# Patient Record
Sex: FEMALE | Race: WHITE | NOT HISPANIC OR LATINO | Employment: OTHER | URBAN - METROPOLITAN AREA
[De-identification: names, ages, dates, MRNs, and addresses within clinical notes are randomized per-mention and may not be internally consistent; named-entity substitution may affect disease eponyms.]

---

## 2017-10-23 ENCOUNTER — OFFICE VISIT (OUTPATIENT)
Dept: FAMILY MEDICINE | Facility: CLINIC | Age: 82
End: 2017-10-23

## 2017-10-23 VITALS
HEIGHT: 61 IN | DIASTOLIC BLOOD PRESSURE: 52 MMHG | RESPIRATION RATE: 16 BRPM | SYSTOLIC BLOOD PRESSURE: 122 MMHG | HEART RATE: 100 BPM | WEIGHT: 192.6 LBS | BODY MASS INDEX: 36.36 KG/M2 | OXYGEN SATURATION: 90 %

## 2017-10-23 DIAGNOSIS — K22.2 STRICTURE AND STENOSIS OF ESOPHAGUS: ICD-10-CM

## 2017-10-23 DIAGNOSIS — M35.00 SJOGREN'S SYNDROME, WITH UNSPECIFIED ORGAN INVOLVEMENT (H): ICD-10-CM

## 2017-10-23 DIAGNOSIS — E03.8 OTHER SPECIFIED HYPOTHYROIDISM: ICD-10-CM

## 2017-10-23 DIAGNOSIS — J47.9 BRONCHIECTASIS WITHOUT COMPLICATION (H): ICD-10-CM

## 2017-10-23 DIAGNOSIS — N18.30 CHRONIC KIDNEY DISEASE, STAGE III (MODERATE) (H): ICD-10-CM

## 2017-10-23 DIAGNOSIS — Z23 NEED FOR PROPHYLACTIC VACCINATION AND INOCULATION AGAINST INFLUENZA: ICD-10-CM

## 2017-10-23 DIAGNOSIS — Z00.00 MEDICARE ANNUAL WELLNESS VISIT, SUBSEQUENT: Primary | ICD-10-CM

## 2017-10-23 DIAGNOSIS — H72.92 PERFORATION OF TYMPANIC MEMBRANE, LEFT: ICD-10-CM

## 2017-10-23 DIAGNOSIS — J32.8 OTHER CHRONIC SINUSITIS: ICD-10-CM

## 2017-10-23 DIAGNOSIS — J45.30 MILD PERSISTENT ASTHMA, UNCOMPLICATED: ICD-10-CM

## 2017-10-23 PROCEDURE — 36415 COLL VENOUS BLD VENIPUNCTURE: CPT | Performed by: FAMILY MEDICINE

## 2017-10-23 PROCEDURE — 90662 IIV NO PRSV INCREASED AG IM: CPT | Performed by: FAMILY MEDICINE

## 2017-10-23 PROCEDURE — 99215 OFFICE O/P EST HI 40 MIN: CPT | Mod: 25 | Performed by: FAMILY MEDICINE

## 2017-10-23 PROCEDURE — G0439 PPPS, SUBSEQ VISIT: HCPCS | Performed by: FAMILY MEDICINE

## 2017-10-23 PROCEDURE — G0008 ADMIN INFLUENZA VIRUS VAC: HCPCS | Performed by: FAMILY MEDICINE

## 2017-10-23 NOTE — LETTER
[facility] [address] [place]  [address]  Phone: [phone]    October 24, 2017      June Huddleston  [address] [place] [address]              Dear June,    The results from your recent visit are enclosed.     It was a pleasure to see you as always.   The lab tests show normal thyroid, and minor abnormalities of kidney function and blood sugar.   The kidney abnormality is very minor.     Check it again in one year, Also if you take can less celebrex that would be good.   Best wishes        Sincerely,     Harjinder Roe M.D.    Results for orders placed or performed in visit on 10/23/17   TSH ([facility])   Result Value Ref Range    TSH 4.170 0.450 - 4.500 uIU/mL    Narrative    Performed at:  01 - [facility] [address], [place], [place]  [id number]  : Agustín Almonte MD, Phone:  [phone]   Basic Metabolic Panel (8) ([facility])   Result Value Ref Range    Glucose 103 (H) 65 - 99 mg/dL    Urea Nitrogen 21 8 - 27 mg/dL    Creatinine 1.30 (H) 0.57 - 1.00 mg/dL    eGFR If NonAfricn Am 36 (L) >59 mL/min/1.73    eGFR If Africn Am 42 (L) >59 mL/min/1.73    BUN/Creatinine Ratio 16 12 - 28    Sodium 140 134 - 144 mmol/L    Potassium 4.1 3.5 - 5.2 mmol/L    Chloride 97 96 - 106 mmol/L    Total CO2 25 18 - 28 mmol/L    Calcium 10.4 (H) 8.7 - 10.3 mg/dL    Narrative    Performed at:  01 - [facility] [address], [place], [place]  [id number]  : Agustín Almonte MD, Phone:  [phone]

## 2017-10-23 NOTE — PROGRESS NOTES
Injectable Influenza Immunization Documentation    1.  Is the person to be vaccinated sick today?   No    2. Does the person to be vaccinated have an allergy to a component   of the vaccine?   No  Egg Allergy Algorithm Link    3. Has the person to be vaccinated ever had a serious reaction   to influenza vaccine in the past?   No    4. Has the person to be vaccinated ever had Guillain-Barré syndrome?   No    Form completed by Dorothea Hennessy MA October 23, 2017 9:05 AM           SUBJECTIVE:   June Huddleston is a 89 year old female who presents for Preventive Visit.  Flu shot, fasting labs  Delightful 89-year-old lady who continues to drive, cook her own meals, and enjoy life. She has a cane when she is out and a Walker when at [facility].    Lives in [address]  Totally independent 2017 -Meals / bills  Three kids Son in [facility]-   Ex otis in [facility] [phone]  Son in [place] - [place] - [phone]    Also Multiple medical issues   See notes  coughing - x 5yrs feels it is a nuisance not a major problem not getting worse. Cough persisits - has seen Dr Pretty, but not for  3years  ,had lung biopsy. Dr Betancourt. Last Chest CT in 2015   ENT - Dr Betancourt- sinus culture , perforated eardrum.  She follows up with him periodically  Bronchiectasis - known diagnosis but not having flares of infection/ inflammation. Not currently not using any inhalers as thy were not helpful   Arthritis - celebrex daily, helps   Hypothyroid on levothyroxine.  History of stricture of esophagus.  She has not had any problems with swallowing, and has no symptoms of reflux.    Are you in the first 12 months of your Medicare Part B coverage?  No    Healthy Habits:    Do you get at least three servings of calcium containing foods daily (dairy, green leafy vegetables, etc.)? yes and vit d & calcium & MV    Amount of exercise or daily activities, outside of work: 7 day(s) per week- walks halls, housework    Problems taking medications  regularly No    Medication side effects: No    Have you had an eye exam in the past two years? prn    Do you see a dentist twice per year? yes    Do you have sleep apnea, excessive snoring or daytime drowsiness?no    COGNITIVE SCREEN  1) Repeat 3 items (Banana, Sunrise, Chair)    2) Clock draw: NORMAL  3) 3 item recall: Recalls 3 objects  Results: 3 items recalled: COGNITIVE IMPAIRMENT LESS LIKELY    Mini-CogTM Copyright MYRIAM Ward. Licensed by the author for use in [facility]; reprinted with permission (ruy@[facility]). All rights reserved.        Reviewed and updated as needed this visit by clinical staffTobacco  Allergies  Meds         Reviewed and updated as needed this visit by Provider        Social History   Substance Use Topics     Smoking status: Former Smoker     Quit date: 10/4/1965     Smokeless tobacco: Never Used     Alcohol use 0.0 oz/week     0 Standard drinks or equivalent per week      Comment: special occasions       no alcohol  Past Medical History:   Diagnosis Date     Bronchiectasis (H) 2014 Dr. Cirilo Pretty    Mucus impaction on CT scan- treated with mucolytic, broncoscopy in 2015 neg cultures     Calculus of gallbladder without mention of cholecystitis or obstruction      Chronic rhinitis      Generalized osteoarthrosis, unspecified site      History of lung biopsy 11-18-15    Dr Kerr Barbra- nodule     Migraine, unspecified, without mention of intractable migraine without mention of status migrainosus      Mild persistent asthma      Pneumonia, organism unspecified(486)      Postinflammatory pulmonary fibrosis (H)      Sensorineural hearing loss, unspecified      Sicca syndrome (H)      Sjoegren syndrome (H)      Stricture and stenosis of esophagus      Unspecified hypothyroidism      Past Surgical History:   Procedure Laterality Date     BRONCHOSCOPY FLEXIBLE  6/11/2011    Procedure:BRONCHOSCOPY FLEXIBLE; Surgeon:DILLON BRADFORD; Location:[facility] OR     BRONCHOSCOPY RIGID   6/11/2011    Procedure:BRONCHOSCOPY RIGID; Surgeon:DILLON BRADFORD; Location:[facility] OR     C NONSPECIFIC PROCEDURE      urethral dilatation     CATARACT IOL, RT/LT      Cataract IOL RT/LT     ENDOBRONCHIAL ULTRASOUND FLEXIBLE  6/11/2011    Procedure:ENDOBRONCHIAL ULTRASOUND FLEXIBLE; Flexible Bronchoscopy. Endobronchial Ultra Sound; Surgeon:DILLON BRADFORD; Location:[facility] OR     Current Outpatient Prescriptions   Medication         celecoxib (CELEBREX) 200 MG capsule     levothyroxine (SYNTHROID, LEVOTHROID) 75 MCG tablet     vitamin  B complex with vitamin C (VITAMIN  B COMPLEX) TABS     calcium carb 1250 mg, 500 mg [place],/vitamin D 200 units (OSCAL WITH D) 500-200 MG-UNIT per tablet     Ascorbic Acid (VITAMIN C CR PO)     Multiple Vitamins-Minerals (CENTRUM SILVER PO)     No current facility-administered medications for this visit.        Today's PHQ-2 Score:   PHQ-2 ( 1999 Pfizer) 10/23/2017 10/27/2016   Q1: Little interest or pleasure in doing things 0 0   Q2: Feeling down, depressed or hopeless 0 0   PHQ-2 Score 0 0       Do you feel safe in your environment - Yes    Do you have a Health Care Directive?: Yes: Advance Directive has been received and scanned.    Current providers sharing in care for this patient include:   Patient Care Team:  Harjinder Burton MD as PCP - General (Family Practice)   DR COVINGTON- ENT  DR BARKER - PULM , LAST SEEN IN 2014    Hearing impairment: wears hearing aids    Ability to successfully perform activities of daily living: Yes, no assistance needed     Fall risk:  Fallen 2 or more times in the past year?: No  Any fall with injury in the past year?: No      Home safety:  none identified      The following health maintenance items are reviewed in Epic and correct as of today:  Health Maintenance   Topic Date Due     ASTHMA ACTION PLAN Q1 YR  05/19/1933     ASTHMA CONTROL TEST Q6 MOS  05/19/1933     ADVANCE DIRECTIVE PLANNING Q5 YRS  04/26/2017     TSH Q1 YEAR  10/27/2017      "FALL RISK ASSESSMENT  10/27/2017     MEDICARE ANNUAL WELLNESS VISIT  10/23/2018     TETANUS IMMUNIZATION (SYSTEM ASSIGNED)  10/31/2022     INFLUENZA VACCINE (SYSTEM ASSIGNED)  Completed     PNEUMOCOCCAL  Completed     Labs reviewed in EPIC        ROS:  C: NEGATIVE for fever, chills, change in weight  I: NEGATIVE for worrisome rashes, moles or lesions  E: NEGATIVE for vision changes or irritation  ENT/MOUTH: hearing  Aides, voice hoarse for years  RESP:as above, denies sob  B: NEGATIVE for masses, tenderness or discharge  CV: NEGATIVE for chest pain, palpitations or peripheral edema  GI: NEGATIVE for nausea, abdominal pain, heartburn, or change in bowel habits  : NEGATIVE for frequency, dysuria, or hematuria  MUSCULOSKELETAL:aches in hands and multiple joinnts  N: NEGATIVE for weakness, dizziness or paresthesias  E: NEGATIVE for temperature intolerance, skin/hair changes  H: NEGATIVE for bleeding problems  P: NEGATIVE for changes in mood or affect    OBJECTIVE:   /52  Pulse 100  Resp 16  Ht 1.556 m (5' 1.25\")  Wt 87.4 kg (192 lb 9.6 oz)  SpO2 90%  BMI 36.09 kg/m2   /52  Pulse 100  Resp 16  Ht 1.556 m (5' 1.25\")  Wt 87.4 kg (192 lb 9.6 oz)  SpO2 90%  BMI 36.09 kg/m2    EXAM:   GENERAL APPEARANCE: obese delightful smiling and jokes healthy, alert and no distress  EYES: Eyes grossly normal to inspection, PERRL and conjunctivae and sclerae normal  HENT: oropharynx clear, oral mucous membranes moist and has small perf in left tm   NECK: no adenopathy, no asymmetry, masses, or scars and thyroid normal to palpation  RESP: lungs clear to auscultation - no rales, rhonchi or wheezes  CV: regular rate and rhythm, sys murmur 2/6   no  click or rub, no peripheral edema and peripheral pulses strong  ABDOMEN: soft, nontender, no hepatosplenomegaly, no masses and bowel sounds normal  MS: no musculoskeletal defects are noted and gait is age appropriate without ataxia  SKIN: no suspicious lesions or " rashes  NEURO: Normal strength and tone, sensory exam grossly normal, mentation intact and speech normal  PSYCH: mentation appears normal and affect normal/bright    ASSESSMENT / PLAN:   June was seen today for physical and flu shot.    Diagnoses and all orders for this visit:    Medicare annual wellness visit, subsequent  -     VENOUS COLLECTION  Has good habits , is staying active socially and walking daily    Need for prophylactic vaccination and inoculation against influenza  -     FLU VACCINE, INCREASED ANTIGEN, PRESV FREE, AGE 65+ [17148]  -     ADMIN INFLUENZA (For MEDICARE Patients ONLY) []  Multiple medical issues    Mild persistent asthma, uncomplicated  -     umeclidinium-vilanterol (ANORO ELLIPTA) 62.5-25 MCG/INH oral inhaler; Inhale 1 puff into the lungs daily  If hjelps  Cough stay on    Bronchiectasis without complication (H)  Has persistent cough that got big eval many years ago. Cough and symptoms about the same. Try Anoro to see if helpful for cough.  She is not interested in re evaluation of chronic cough. We agree Unless symptoms worsen observe    Sjogren's syndrome, with unspecified organ involvement (H)    Chronic kidney disease, stage III (moderate)  -repeat BMP annually     Stricture and stenosis of esophagus  Feels no sticking or reflux    Other specified hypothyroidism  -     TSH ([facility])  -     Basic Metabolic Panel (8) ([facility])      Perforation of tympanic membrane, left  staus quo per ENT    Other chronic sinusitis  Hist of, okay currently          End of Life Planning:  Patient currently has an advanced directive: Yes.  Practitioner is supportive of decision.    COUNSELING:  Reviewed preventive health counseling, as reflected in patient instructions       Regular exercise       Healthy diet/nutrition       Dental care       Osteoporosis Prevention/Bone Health        Estimated body mass index is 36.09 kg/(m^2) as calculated from the following:    Height as of this encounter:  "1.556 m (5' 1.25\").    Weight as of this encounter: 87.4 kg (192 lb 9.6 oz).  Weight management plan: Discussed healthy diet and exercise guidelines and patient will follow up in 12 months in clinic to re-evaluate.   reports that she quit smoking about 52 years ago. She has never used smokeless tobacco.        Appropriate preventive services were discussed with this patient, including applicable screening as appropriate for cardiovascular disease, diabetes, osteopenia/osteoporosis, and glaucoma.  As appropriate for age/gender, discussed screening for colorectal cancer, prostate cancer, breast cancer, and cervical cancer. Checklist reviewing preventive services available has been given to the patient.    Reviewed patients plan of care and provided an AVS. The Basic Care Plan (routine screening as documented in Health Maintenance) for June meets the Care Plan requirement. This Care Plan has been established and reviewed with the Patient.    Counseling Resources:  ATP IV Guidelines  Pooled Cohorts Equation Calculator  Breast Cancer Risk Calculator  FRAX Risk Assessment  ICSI Preventive Guidelines  Dietary Guidelines for Americans, 2010  USDA's MyPlate  ASA Prophylaxis  Lung CA Screening    >40 min spent with patient, greater than 50% spent on discussion/education/planning - as outlined in assessment and plan      Harjinder Burton MD  [facility]  "

## 2017-10-23 NOTE — MR AVS SNAPSHOT
After Visit Summary   10/23/2017    June Huddleston    MRN: [id number]           Patient Information     Date Of Birth          5/19/1928        Visit Information        Provider Department      10/23/2017 9:00 AM Harjinder Burton MD [facility]        Today's Diagnoses     Need for prophylactic vaccination and inoculation against influenza    -  1    Medicare annual wellness visit, subsequent        Mild persistent asthma, uncomplicated        Bronchiectasis without complication (H)        Sjogren's syndrome, with unspecified organ involvement (H)        Chronic kidney disease, stage III (moderate)        Stricture and stenosis of esophagus        Diaphragmatic hernia without obstruction and without gangrene        Other specified hypothyroidism        Perforation of tympanic membrane, left        Other chronic sinusitis          Care Instructions      Preventive Health Recommendations    Female Ages 65 +    Yearly exam:     See your health care provider every year in order to  o Review health changes.   o Discuss preventive care.    o Review your medicines if your doctor has prescribed any.      You no longer need a yearly Pap test unless you've had an abnormal Pap test in the past 10 years. If you have vaginal symptoms, such as bleeding or discharge, be sure to talk with your provider about a Pap test.      Every 1 to 2 years, have a mammogram.  If you are over 69, talk with your health care provider about whether or not you want to continue having screening mammograms.      Every 10 years, have a colonoscopy. Or, have a yearly FIT test (stool test). These exams will check for colon cancer.       Have a cholesterol test every 5 years, or more often if your doctor advises it.       Have a diabetes test (fasting glucose) every three years. If you are at risk for diabetes, you should have this test more often.       At age 65, have a bone density scan (DEXA) to check for osteoporosis (brittle  bone disease).    Shots:    Get a flu shot each year.    Get a tetanus shot every 10 years.    Talk to your doctor about your pneumonia vaccines. There are now two you should receive - Pneumovax (PPSV 23) and Prevnar (PCV 13).    Talk to your doctor about the shingles vaccine.    Talk to your doctor about the hepatitis B vaccine.    Nutrition:     Eat at least 5 servings of fruits and vegetables each day.      Eat whole-grain bread, whole-wheat pasta and brown rice instead of white grains and rice.      Talk to your provider about Calcium and Vitamin D.     Lifestyle    Exercise at least 150 minutes a week (30 minutes a day, 5 days a week). This will help you control your weight and prevent disease.      Limit alcohol to one drink per day.      No smoking.       Wear sunscreen to prevent skin cancer.       See your dentist twice a year for an exam and cleaning.      See your eye doctor every 1 to 2 years to screen for conditions such as glaucoma, macular degeneration and cataracts.          Follow-ups after your visit        Who to contact     If you have questions or need follow up information about today's clinic visit or your schedule please contact [facility] directly at [phone].  Normal or non-critical lab and imaging results will be communicated to you by [employer], letter or phone within 4 business days after the clinic has received the results. If you do not hear from us within 7 days, please contact the clinic through [employer] or phone. If you have a critical or abnormal lab result, we will notify you by phone as soon as possible.  Submit refill requests through [employer] or call your pharmacy and they will forward the refill request to us. Please allow 3 business days for your refill to be completed.          Additional Information About Your Visit        [employer] Information     [employer] lets you send messages to your doctor, view your test results, renew your prescriptions, schedule  "appointments and more. To sign up, go to [email or web address] . Click on \"Log in\" on the left side of the screen, which will take you to the Welcome page. Then click on \"Sign up Now\" on the right side of the page.     You will be asked to enter the access code listed below, as well as some personal information. Please follow the directions to create your username and password.     Your access code is: [email or web address]  Expires: 2018  9:52 AM     Your access code will  in 90 days. If you need help or a new code, please call your [facility] clinic or [phone].        Care EveryWhere ID     This is your Care EveryWhere ID. This could be used by other organizations to access your [facility] medical records  [id number]        Your Vitals Were     Pulse Respirations Height Pulse Oximetry BMI (Body Mass Index)       100 16 1.556 m (5' 1.25\") 90% 36.09 kg/m2        Blood Pressure from Last 3 Encounters:   10/23/17 122/52   16 124/86   10/27/16 126/64    Weight from Last 3 Encounters:   10/23/17 87.4 kg (192 lb 9.6 oz)   16 85.3 kg (188 lb)   10/27/16 85.5 kg (188 lb 6.4 oz)              We Performed the Following     ADMIN INFLUENZA (For MEDICARE Patients ONLY) []     Basic Metabolic Panel (8) ([facility])     FLU VACCINE, INCREASED ANTIGEN, PRESV FREE, AGE 65+ [43757]     TSH ([facility])          Today's Medication Changes          These changes are accurate as of: 10/23/17  9:52 AM.  If you have any questions, ask your nurse or doctor.               Start taking these medicines.        Dose/Directions    umeclidinium-vilanterol 62.5-25 MCG/INH oral inhaler   Commonly known as:  ANORO ELLIPTA   Used for:  Mild persistent asthma, uncomplicated   Started by:  Harjinder Burton MD        Dose:  1 puff   Inhale 1 puff into the lungs daily   Quantity:  1 Inhaler   Refills:  11         Stop taking these medicines if you haven't already. Please contact your care team if you have questions.     albuterol 108 " (90 BASE) MCG/ACT Inhaler   Commonly known as:  PROAIR HFA/PROVENTIL HFA/VENTOLIN HFA   Stopped by:  Harjinder Burton MD           PROBIOTIC DAILY Caps   Stopped by:  Harjinder Burton MD                Where to get your medicines      These medications were sent to [id number] - [place], [place] - [address] AT [address]  [address] [place] [place] [address]    Hours:  24-hours Phone:  [phone]     umeclidinium-vilanterol 62.5-25 MCG/INH oral inhaler                Primary Care Provider Office Phone # Fax #    Harjinder Burton [phone] [address]  [facility] [address]        Equal Access to Services     [facility]: Hadii aad ku hadasho Soomaali, waaxda luqadaha, qaybta kaalmada adeegyada, waxay idiin hayaan adeisael mancilla . So [facility] [phone].    ATENCIÓN: Si habla español, tiene a wong disposición servicios gratuitos de asistencia lingüística. [facility] [phone].    We comply with applicable federal civil rights laws and [place] laws. We do not discriminate on the basis of race, color, national origin, age, disability, sex, sexual orientation, or gender identity.            Thank you!     Thank you for choosing [facility]  for your care. Our goal is always to provide you with excellent care. Hearing back from our patients is one way we can continue to improve our services. Please take a few minutes to complete the written survey that you may receive in the mail after your visit with us. Thank you!             Your Updated Medication List - Protect others around you: Learn how to safely use, store and throw away your medicines at www.disposemymeds.org.          This list is accurate as of: 10/23/17  9:52 AM.  Always use your most recent med list.                   Brand Name Dispense Instructions for use Diagnosis    calcium carb 1250 mg (500 mg [place])/vitamin D 200 units 500-200 MG-UNIT per tablet    OSCAL with D    100  tablet    Take 1 tablet by mouth daily        celecoxib 200 MG capsule    celeBREX    60 capsule    TAKE 1 CAPSULE BY MOUTH AS NEEDED FOR PAIN ON A DAILY BASIS    Medication refill       CENTRUM SILVER PO      Take 1 tablet by mouth daily.        levothyroxine 75 MCG tablet    SYNTHROID/LEVOTHROID    90 tablet    Take 1 tablet (75 mcg) by mouth daily    Encounter for medication refill       umeclidinium-vilanterol 62.5-25 MCG/INH oral inhaler    ANORO ELLIPTA    1 Inhaler    Inhale 1 puff into the lungs daily    Mild persistent asthma, uncomplicated       vitamin B complex with vitamin C Tabs tablet      Take 1 tablet by mouth daily        VITAMIN C CR PO      Take  by mouth daily.

## 2017-10-24 LAB
BUN SERPL-MCNC: 21 MG/DL (ref 8–27)
BUN/CREATININE RATIO: 16 (ref 12–28)
CALCIUM SERPL-MCNC: 10.4 MG/DL (ref 8.7–10.3)
CHLORIDE SERPLBLD-SCNC: 97 MMOL/L (ref 96–106)
CREAT SERPL-MCNC: 1.3 MG/DL (ref 0.57–1)
EGFR IF AFRICN AM: 42 ML/MIN/1.73
EGFR IF NONAFRICN AM: 36 ML/MIN/1.73
GLUCOSE SERPL-MCNC: 103 MG/DL (ref 65–99)
POTASSIUM SERPL-SCNC: 4.1 MMOL/L (ref 3.5–5.2)
SODIUM SERPL-SCNC: 140 MMOL/L (ref 134–144)
TOTAL CO2: 25 MMOL/L (ref 18–28)
TSH BLD-ACNC: 4.17 UIU/ML (ref 0.45–4.5)

## 2017-11-20 DIAGNOSIS — Z76.0 ENCOUNTER FOR MEDICATION REFILL: ICD-10-CM

## 2017-11-20 RX ORDER — LEVOTHYROXINE SODIUM 75 UG/1
TABLET ORAL
Qty: 90 TABLET | Refills: 3 | Status: SHIPPED | OUTPATIENT
Start: 2017-11-20 | End: 2018-10-25

## 2017-11-20 NOTE — TELEPHONE ENCOUNTER
LEVOTHROID) 75 MCG last OV - cpx & labs 10/23/17  Dorothea Hennessy MA November 20, 2017 10:07 AM    TSH - 4.170 on 10/23/17

## 2018-10-25 ENCOUNTER — OFFICE VISIT (OUTPATIENT)
Dept: FAMILY MEDICINE | Facility: CLINIC | Age: 83
End: 2018-10-25

## 2018-10-25 VITALS
HEART RATE: 87 BPM | BODY MASS INDEX: 36.21 KG/M2 | WEIGHT: 191.8 LBS | HEIGHT: 61 IN | DIASTOLIC BLOOD PRESSURE: 84 MMHG | RESPIRATION RATE: 16 BRPM | OXYGEN SATURATION: 94 % | SYSTOLIC BLOOD PRESSURE: 124 MMHG

## 2018-10-25 DIAGNOSIS — Z00.00 ROUTINE GENERAL MEDICAL EXAMINATION AT A HEALTH CARE FACILITY: Primary | ICD-10-CM

## 2018-10-25 DIAGNOSIS — E03.9 HYPOTHYROIDISM, UNSPECIFIED TYPE: ICD-10-CM

## 2018-10-25 DIAGNOSIS — Z13.1 SCREENING FOR DIABETES MELLITUS: ICD-10-CM

## 2018-10-25 DIAGNOSIS — J45.30 MILD PERSISTENT ASTHMA, UNCOMPLICATED: ICD-10-CM

## 2018-10-25 DIAGNOSIS — L82.1 SEBORRHEIC KERATOSES: ICD-10-CM

## 2018-10-25 DIAGNOSIS — N18.30 CHRONIC KIDNEY DISEASE, STAGE III (MODERATE) (H): ICD-10-CM

## 2018-10-25 DIAGNOSIS — R05.3 CHRONIC COUGH: ICD-10-CM

## 2018-10-25 DIAGNOSIS — J84.10 POSTINFLAMMATORY PULMONARY FIBROSIS (H): ICD-10-CM

## 2018-10-25 DIAGNOSIS — N63.0 LUMP OR MASS IN BREAST: ICD-10-CM

## 2018-10-25 DIAGNOSIS — M35.00 SJOGREN'S SYNDROME, WITH UNSPECIFIED ORGAN INVOLVEMENT (H): ICD-10-CM

## 2018-10-25 DIAGNOSIS — Z23 NEED FOR PROPHYLACTIC VACCINATION AND INOCULATION AGAINST INFLUENZA: ICD-10-CM

## 2018-10-25 LAB
% GRANULOCYTES: 68.4 % (ref 42.2–75.2)
HCT VFR BLD AUTO: 44.7 % (ref 35–46)
HEMOGLOBIN: 14.4 G/DL (ref 11.8–15.5)
LYMPHOCYTES NFR BLD AUTO: 24.6 % (ref 20.5–51.1)
MCH RBC QN AUTO: 29.4 PG (ref 27–31)
MCHC RBC AUTO-ENTMCNC: 32.2 G/DL (ref 33–37)
MCV RBC AUTO: 91.3 FL (ref 80–100)
MONOCYTES NFR BLD AUTO: 7 % (ref 1.7–9.3)
PLATELET # BLD AUTO: 271 K/UL (ref 140–450)
RBC # BLD AUTO: 4.89 X10/CMM (ref 3.7–5.2)
WBC # BLD AUTO: 8 X10/CMM (ref 3.8–11)

## 2018-10-25 PROCEDURE — 85025 COMPLETE CBC W/AUTO DIFF WBC: CPT | Performed by: FAMILY MEDICINE

## 2018-10-25 PROCEDURE — 90662 IIV NO PRSV INCREASED AG IM: CPT | Performed by: FAMILY MEDICINE

## 2018-10-25 PROCEDURE — G0439 PPPS, SUBSEQ VISIT: HCPCS | Performed by: FAMILY MEDICINE

## 2018-10-25 PROCEDURE — 36415 COLL VENOUS BLD VENIPUNCTURE: CPT | Performed by: FAMILY MEDICINE

## 2018-10-25 PROCEDURE — 99212 OFFICE O/P EST SF 10 MIN: CPT | Mod: 25 | Performed by: FAMILY MEDICINE

## 2018-10-25 PROCEDURE — G0008 ADMIN INFLUENZA VIRUS VAC: HCPCS | Performed by: FAMILY MEDICINE

## 2018-10-25 RX ORDER — ALBUTEROL SULFATE 90 UG/1
1-2 AEROSOL, METERED RESPIRATORY (INHALATION) EVERY 4 HOURS PRN
Qty: 1 INHALER | Refills: 11 | Status: SHIPPED | OUTPATIENT
Start: 2018-10-25 | End: 2020-02-11

## 2018-10-25 RX ORDER — CELECOXIB 200 MG/1
CAPSULE ORAL
Qty: 90 CAPSULE | Refills: 3 | Status: SHIPPED | OUTPATIENT
Start: 2018-10-25 | End: 2020-03-23

## 2018-10-25 RX ORDER — LEVOTHYROXINE SODIUM 75 UG/1
75 TABLET ORAL DAILY
Qty: 90 TABLET | Refills: 3 | Status: SHIPPED | OUTPATIENT
Start: 2018-10-25 | End: 2019-08-16

## 2018-10-25 NOTE — LETTER
My Asthma Action Plan  Name: June Huddleston   YOB: 1928  Date: 10/25/2018   My doctor: Rosa Oneal MD   My clinic: [facility]        My Control Medicine: Anoro ellipta  My Rescue Medicine: Anoro Ellipta   My Asthma Severity: intermittent  Avoid your asthma triggers: Patient is unaware of triggers               GREEN ZONE   Good Control    I feel good    No cough or wheeze    Can work, sleep and play without asthma symptoms       Take your asthma control medicine every day.     1. If exercise triggers your asthma, take your rescue medication    15 minutes before exercise or sports, and    During exercise if you have asthma symptoms  2. Spacer to use with inhaler: If you have a spacer, make sure to use it with your inhaler             YELLOW ZONE Getting Worse  I have ANY of these:    I do not feel good    Cough or wheeze    Chest feels tight    Wake up at night   1. Keep taking your Green Zone medications  2. Start taking your rescue medicine:    every 20 minutes for up to 1 hour. Then every 4 hours for 24-48 hours.  3. If you stay in the Yellow Zone for more than 12-24 hours, contact your doctor.  4. If you do not return to the Green Zone in 12-24 hours or you get worse, start taking your oral steroid medicine if prescribed by your provider.           RED ZONE Medical Alert - Get Help  I have ANY of these:    I feel awful    Medicine is not helping    Breathing getting harder    Trouble walking or talking    Nose opens wide to breathe       1. Take your rescue medicine NOW  2. If your provider has prescribed an oral steroid medicine, start taking it NOW  3. Call your doctor NOW  4. If you are still in the Red Zone after 20 minutes and you have not reached your doctor:    Take your rescue medicine again and    Call 911 or go to the emergency room right away    See your regular doctor within 2 weeks of an Emergency Room or Urgent Care visit for follow-up treatment.          Annual  Reminders:  Meet with Asthma Educator,  Flu Shot in the Fall, consider Pneumonia Vaccination for patients with asthma (aged 19 and older).    Pharmacy:    [facility] [place], [place] - [address] AT [address] [place] [address] & [facility] [id number]  [place] - [address] [place] [address] AT [address] [place] [address] & [address]                      Asthma Triggers  How To Control Things That Make Your Asthma Worse    Triggers are things that make your asthma worse.  Look at the list below to help you find your triggers and what you can do about them.  You can help prevent asthma flare-ups by staying away from your triggers.      Trigger                                                          What you can do   Cigarette Smoke  Tobacco smoke can make asthma worse. Do not allow smoking in your home, car or around you.  Be sure no one smokes at a child s day care or school.  If you smoke, ask your health care provider for ways to help you quit.  Ask family members to quit too.  Ask your health care provider for a referral to Quit Plan to help you quit smoking, or call 6-606-820-PLAN.     Colds, Flu, Bronchitis  These are common triggers of asthma. Wash your hands often.  Don t touch your eyes, nose or mouth.  Get a flu shot every year.     Dust Mites  These are tiny bugs that live in cloth or carpet. They are too small to see. Wash sheets and blankets in hot water every week.   Encase pillows and mattress in dust mite proof covers.  Avoid having carpet if you can. If you have carpet, vacuum weekly.   Use a dust mask and HEPA vacuum.   Pollen and Outdoor Mold  Some people are allergic to trees, grass, or weed pollen, or molds. Try to keep your windows closed.  Limit time out doors when pollen count is high.   Ask you health care provider about taking medicine during allergy season.     Animal Dander  Some people are allergic to skin flakes, urine or saliva from pets with fur or feathers. Keep pets with fur or  feathers out of your home.    If you can t keep the pet outdoors, then keep the pet out of your bedroom.  Keep the bedroom door closed.  Keep pets off cloth furniture and away from stuffed toys.     Mice, Rats, and Cockroaches  Some people are allergic to the waste from these pests.   Cover food and garbage.  Clean up spills and food crumbs.  Store grease in the refrigerator.   Keep food out of the bedroom.   Indoor Mold  This can be a trigger if your home has high moisture. Fix leaking faucets, pipes, or other sources of water.   Clean moldy surfaces.  Dehumidify basement if it is damp and smelly.   Smoke, Strong Odors, and Sprays  These can reduce air quality. Stay away from strong odors and sprays, such as perfume, powder, hair spray, paints, smoke incense, paint, cleaning products, candles and new carpet.   Exercise or Sports  Some people with asthma have this trigger. Be active!  Ask your doctor about taking medicine before sports or exercise to prevent symptoms.    Warm up for 5-10 minutes before and after sports or exercise.     Other Triggers of Asthma  Cold air:  Cover your nose and mouth with a scarf.  Sometimes laughing or crying can be a trigger.  Some medicines and food can trigger asthma.

## 2018-10-25 NOTE — PROGRESS NOTES
SUBJECTIVE:   June Huddleston is a 89 year old female who presents for Preventive Visit.  CONCERNS:   COPD - persistent cough. Uses albuterol inhaler prn. Is not on any long acting inhalers.   Sinus Problems - persistent. No fevers.   She tells me she has been misdiagnosed with cancer twice in her lifetime - breast and lung. States has not had cancer. Wants me to be aware of her hx. No actions items.  Overall, she prefers less intervention to more.   Has numerous skin lesions she would like to have assessed. They catch on her clothing or w/ routine hygiene and become irritated, painful and sometimes bleed.   Are you in the first 12 months of your Medicare Part B coverage?  No    Healthy Habits:    Do you get at least three servings of calcium containing foods daily (dairy, green leafy vegetables, etc.)? Yes, drinks milk    Amount of exercise or daily activities, outside of work: Does own cleaning/ housework to keep active.    Problems taking medications regularly No    Medication side effects: No    Have you had an eye exam in the past two years? no    Do you see a dentist twice per year? yes    Do you have sleep apnea, excessive snoring or daytime drowsiness?no    Ability to successfully perform activities of daily living: Yes, no assistance needed    Home safety:  none identified     Hearing impairment: Hearing Aids -     Fall risk:  Fallen 2 or more times in the past year?: Yes  Any fall with injury in the past year?: No  Fell last week in the garage.     COGNITIVE SCREEN  1) Repeat 3 items (Leader, Season, Table)    2) Clock draw: NORMAL  3) 3 item recall: Recalls 3 objects  Results: 3 items recalled: COGNITIVE IMPAIRMENT LESS LIKELY    Mini-CogTM Copyright S Sylvia. Licensed by the author for use in [facility]; reprinted with permission (ruy@.[facility]). All rights reserved.      Reviewed and updated as needed this visit by clinical staff  Tobacco  Allergies  Meds         Reviewed and updated as  needed this visit by Provider        Social History   Substance Use Topics     Smoking status: Former Smoker     Quit date: 10/4/1965     Smokeless tobacco: Never Used     Alcohol use 0.0 oz/week     0 Standard drinks or equivalent per week      Comment: special occasions     If you drink alcohol do you typically have >3 drinks per day or >7 drinks per week? Not Applicable                        Today's PHQ-2 Score:   PHQ-2 ( 1999 Pf[employer]) 10/25/2018 10/23/2017   Q1: Little interest or pleasure in doing things 0 0   Q2: Feeling down, depressed or hopeless 0 0   PHQ-2 Score 0 0     Do you feel safe in your environment - Yes    Do you have a Health Care Directive?: Yes: Advance Directive has been received and scanned.    Current providers sharing in care for this patient include:   Patient Care Team:  Rosa Oneal MD as PCP - General (Family Practice)    The following health maintenance items are reviewed in Epic and correct as of today:  Health Maintenance   Topic Date Due     ADVANCE DIRECTIVE PLANNING Q5 YRS  04/26/2017     ASTHMA CONTROL TEST Q6 MOS  04/25/2019     TSH Q1 YEAR  10/25/2019     MEDICARE ANNUAL WELLNESS VISIT  10/25/2019     ASTHMA ACTION PLAN Q1 YR  10/25/2019     FALL RISK ASSESSMENT  10/25/2019     PHQ-2 Q1 YR  10/25/2019     TETANUS IMMUNIZATION (SYSTEM ASSIGNED)  10/31/2022     PNEUMOCOCCAL  Completed     INFLUENZA VACCINE  Completed     BP Readings from Last 3 Encounters:   11/01/18 118/64   10/25/18 124/84   10/23/17 122/52    Wt Readings from Last 3 Encounters:   11/01/18 87 kg (191 lb 12.8 oz)   10/25/18 87 kg (191 lb 12.8 oz)   10/23/17 87.4 kg (192 lb 9.6 oz)         Patient Active Problem List   Diagnosis     Allergic rhinitis     Postinflammatory pulmonary fibrosis (H)     Sensorineural hearing loss     Generalized osteoarthrosis, unspecified site     Chronic rhinitis     Mild persistent asthma     Stricture and stenosis of esophagus     Diaphragmatic hernia     Calculus of  gallbladder     Chronic kidney disease, stage III (moderate) (H)     History of lung biopsy     Bronchiectasis without complication (H)     Sjogren's syndrome, with unspecified organ involvement (H)     Other chronic sinusitis     Past Surgical History:   Procedure Laterality Date     BRONCHOSCOPY FLEXIBLE  6/11/2011    Procedure:BRONCHOSCOPY FLEXIBLE; Surgeon:DILLON BRADFORD; Location:[facility]     BRONCHOSCOPY RIGID  6/11/2011    Procedure:BRONCHOSCOPY RIGID; Surgeon:DILLON BRADFORD; Location:[facility]     C NONSPECIFIC PROCEDURE      urethral dilatation     CATARACT IOL, RT/LT      Cataract IOL RT/LT     ENDOBRONCHIAL ULTRASOUND FLEXIBLE  6/11/2011    Procedure:ENDOBRONCHIAL ULTRASOUND FLEXIBLE; Flexible Bronchoscopy. Endobronchial Ultra Sound; Surgeon:DILLON BRADFORD; Location:[facility] OR       Social History   Substance Use Topics     Smoking status: Former Smoker     Quit date: 10/4/1965     Smokeless tobacco: Never Used     Alcohol use 0.0 oz/week     0 Standard drinks or equivalent per week      Comment: special occasions     Family History   Problem Relation Age of Onset     Unknown/Adopted Mother      Unknown/Adopted Father      Unknown/Adopted Maternal Grandmother      Unknown/Adopted Maternal Grandfather      Unknown/Adopted Paternal Grandmother      Unknown/Adopted Paternal Grandfather      Unknown/Adopted Brother      Unknown/Adopted Sister      Unknown/Adopted No family hx of      Unknown/Adopted Son      Unknown/Adopted Daughter      Unknown/Adopted Other          Current Outpatient Prescriptions   Medication Sig Dispense Refill     albuterol (PROAIR HFA/PROVENTIL HFA/VENTOLIN HFA) 108 (90 Base) MCG/ACT inhaler Inhale 1-2 puffs into the lungs every 4 hours as needed for shortness of breath / dyspnea 1 Inhaler 11     Ascorbic Acid (VITAMIN C CR PO) Take  by mouth daily.       calcium carb 1250 mg, 500 mg [place],/vitamin D 200 units (OSCAL WITH D) 500-200 MG-UNIT per tablet Take 1 tablet by  mouth daily 100 tablet 3     celecoxib (CELEBREX) 200 MG capsule TAKE 1 CAPSULE BY MOUTH AS NEEDED FOR PAIN ON A DAILY BASIS 90 capsule 3     levothyroxine (SYNTHROID/LEVOTHROID) 75 MCG tablet Take 1 tablet (75 mcg) by mouth daily 90 tablet 3     Multiple Vitamins-Minerals (CENTRUM SILVER PO) Take 1 tablet by mouth daily.       vitamin  B complex with vitamin C (VITAMIN  B COMPLEX) TABS Take 1 tablet by mouth daily       levothyroxine (SYNTHROID/LEVOTHROID) 88 MCG tablet Take 88 mcg Tues, Thurs, Sat, Sun. Take 75 mcg on Mon, Wed, Friday. 90 tablet 1     Allergies   Allergen Reactions     Astelin [Azelastine Hydrochloride]      too drying     Belladonna Alkaloids      cafergot (nausea) with phenobarbital; reviewed with [facility]     Oxybutynin Chloride      dry mouth; reviewed with [facility]     Penicillins Itching     Reviewed with [facility]     Shellfish-Derived Products GI Disturbance, Nausea and Vomiting and Nausea     Vomited for 24 hours after eating  Vomited for 24 hours after eating     Sudafed [Pseudoephedrine Hcl]      too drying     Triamcinolone Acetonide      azmacort (ha)     Lanolin Rash     Recent Labs   Lab Test  10/25/18   1141  10/23/17   1000  10/27/16   1035  10/26/15   0930   10/27/14   1052  10/30/13   0905   10/31/12   1013   07/14/11   0245  07/13/11   1535   LDL   --    --    --    --    --   124*  143*   --   130*   < >  72   --    HDL   --    --    --    --    --   52  57   --   56   < >  26*   --    TRIG   --    --    --    --    --   112  157*   --   146   < >  100   --    ALT  19   --   13  11   < >   --    --    < >   --    < >   --   8   CR  1.17*  1.30*  1.18*  1.18*   < >   --    --    < >   --    < >  0.81  0.98   GFRESTIMATED   --    --    --    --    --    --    --    --    --    --   68  54*   GFRESTBLACK   --    --    --    --    --    --    --    --    --    --   82  66   POTASSIUM  4.2  4.1  4.5  4.4   < >   --    --    < >   --    < >  3.7  3.7  "  TSH   --    --    --    --    --    --    --    --    --    --    --   3.30    < > = values in this interval not displayed.      Pap not indicated.   She is interested in mammo due to hx of misdx of breast cancer.   She has had both pneumonia vaccinations, current on Tdap, has had 1 zoster vaccination. Desires flu shot today.     ROS:  Constitutional, HEENT, cardiovascular, pulmonary, GI, , musculoskeletal, neuro, skin, endocrine and psych systems are negative, except as otherwise noted.    OBJECTIVE:   /84  Pulse 87  Resp 16  Ht 1.549 m (5' 1\")  Wt 87 kg (191 lb 12.8 oz)  SpO2 94%  BMI 36.24 kg/m2 Estimated body mass index is 36.24 kg/(m^2) as calculated from the following:    Height as of this encounter: 1.549 m (5' 1\").    Weight as of this encounter: 87 kg (191 lb 12.8 oz).  EXAM:   GENERAL: healthy, alert and no distress. Appears younger than her 89 years.   EYES: Eyes grossly normal to inspection, PERRL and conjunctivae and sclerae normal  HENT: ear canals and TM's normal, nose and mouth without ulcers or lesions  NECK: no adenopathy, no asymmetry, masses, or scars and thyroid normal to palpation  RESP: lungs clear to auscultation - no rales, rhonchi or wheezes but lung sounds are distant.  BREAST: Fatty texture. I palpate a firm, immobile 1 cm mass/lump at 7;00 just inferior areola. No nipple discharge and no palpable axillary masses or adenopathy.   CV: regular rate and rhythm, normal S1 S2, no S3 or S4 with II/VI murmur (pt states is not new), no click or rub, no peripheral edema and peripheral pulses strong  ABDOMEN: soft, nontender, no hepatosplenomegaly, no masses and bowel sounds normal  MS: no gross musculoskeletal defects noted, no edema  SKIN: no suspicious lesions or rashes but multiple SKs present. She is wearing very heavy facial makeup; difficult to get a good inspection of all of her facial skin.   NEURO: Normal strength and tone, mentation intact and speech normal  PSYCH: " mentation appears normal, affect normal/bright    ASSESSMENT / PLAN:   June was seen today for physical, hearing screening and flu shot.    Diagnoses and all orders for this visit:    Routine general medical examination at a health care facility    Postinflammatory pulmonary fibrosis (H)    Sjogren's syndrome, with unspecified organ involvement (H)  -     Comp. Metabolic Panel (14) ([facility])  -     CBC with Diff/Plt ([facility])    Chronic kidney disease, stage III (moderate) (H)  -     Comp. Metabolic Panel (14) ([facility])    Mild persistent asthma, uncomplicated   Refill albuterol inhaler     Chronic cough  -     Comp. Metabolic Panel (14) ([facility])  -     CBC with Diff/Plt ([facility])  -     albuterol (PROAIR HFA/PROVENTIL HFA/VENTOLIN HFA) 108 (90 Base) MCG/ACT inhaler; Inhale 1-2 puffs into the lungs every 4 hours as needed for shortness of breath / dyspnea    Lump or mass in breast  -     MAMMO - Diagnostic Digital Bilateral (FUTURE/[facility]); Future   I have her palpate the lump in her breast. She confirms she can feel it and locate it w/o assistance. She is not sure if this is the location of her prior lump.   She is interested in pursuing further evaluation.     Hypothyroidism, unspecified type  -     TSH ([facility])  -     CBC with Diff/Plt ([facility])        -     levothyroxine (SYNTHROID/LEVOTHROID) 75 MCG tablet; Take 1 tablet (75 mcg) by mouth daily   Verify dosage is correct    Seborrheic keratoses   OK to RTC at any time for removal.     Screening for diabetes mellitus   Included in CMP.     Need for prophylactic vaccination and inoculation against influenza  -     FLU VACCINE, INCREASED ANTIGEN, PRESV FREE, AGE 65+ [01038]  -     ADMIN INFLUENZA (For MEDICARE Patients ONLY) []   Provided counseling to patient in influenza immunization. No known complications w/ vaccination today.     Other orders  -     Cancel: Lipid Panel ([facility]) - lab was pended, but we elect not to collect cholesterol today.   -      "celecoxib (CELEBREX) 200 MG capsule; TAKE 1 CAPSULE BY MOUTH AS NEEDED FOR PAIN ON A DAILY BASIS - has been taking prn for a few years for musculoskeletal pain.   -     Cancel: umeclidinium-vilanterol (ANORO ELLIPTA) 62.5-25 MCG/INH oral inhaler; Inhale 1 puff into the lungs daily - pt declines. States is not necessary.      End of Life Planning:  Patient currently has an advanced directive: Yes.  Practitioner is supportive of decision.    COUNSELING:  Reviewed preventive health counseling, as reflected in patient instructions       Regular exercise       Healthy diet/nutrition       Vision screening       Hearing screening       Dental care       Osteoporosis Prevention/Bone Health    BP Readings from Last 1 Encounters:   11/01/18 118/64     Estimated body mass index is 36.24 kg/(m^2) as calculated from the following:    Height as of this encounter: 1.549 m (5' 1\").    Weight as of this encounter: 87 kg (191 lb 12.8 oz).    Weight management plan: Discussed healthy diet and exercise guidelines and patient will follow up in 12 months in clinic to re-evaluate.     reports that she quit smoking about 53 years ago. She has never used smokeless tobacco.    Appropriate preventive services were discussed with this patient, including applicable screening as appropriate for cardiovascular disease, diabetes, osteopenia/osteoporosis, and glaucoma.  As appropriate for age/gender, discussed screening for colorectal cancer, prostate cancer, breast cancer, and cervical cancer. Checklist reviewing preventive services available has been given to the patient.    Reviewed patients plan of care and provided an AVS. The Basic Care Plan (routine screening as documented in Health Maintenance) for June meets the Care Plan requirement. This Care Plan has been established and reviewed with the Patient.    Counseling Resources:  ATP IV Guidelines  Pooled Cohorts Equation Calculator  Breast Cancer Risk Calculator  FRAX Risk " Assessment  ICSI Preventive Guidelines  Dietary Guidelines for Americans, 2010  [employer]'s MyPlate  ASA Prophylaxis  Lung CA Screening    Rsoa Oenal MD  [facility]    Health Maintenance   Topic Date Due     ADVANCE DIRECTIVE PLANNING Q5 YRS  04/26/2017     ASTHMA CONTROL TEST Q6 MOS  04/25/2019     TSH Q1 YEAR  10/25/2019     MEDICARE ANNUAL WELLNESS VISIT  10/25/2019     ASTHMA ACTION PLAN Q1 YR  10/25/2019     FALL RISK ASSESSMENT  10/25/2019     PHQ-2 Q1 YR  10/25/2019     TETANUS IMMUNIZATION (SYSTEM ASSIGNED)  10/31/2022     PNEUMOCOCCAL  Completed     INFLUENZA VACCINE  Completed         Injectable Influenza Immunization Documentation    1.  Is the person to be vaccinated sick today?   No    2. Does the person to be vaccinated have an allergy to a component   of the vaccine?   No  Egg Allergy Algorithm Link    3. Has the person to be vaccinated ever had a serious reaction   to influenza vaccine in the past?   No    4. Has the person to be vaccinated ever had Guillain-Barré syndrome?   No    Form completed by Kami Choi [facility]

## 2018-10-25 NOTE — MR AVS SNAPSHOT
After Visit Summary   10/25/2018    June Huddleston    MRN: [id number]           Patient Information     Date Of Birth          5/19/1928        Visit Information        Provider Department      10/25/2018 10:00 AM Rosa Oneal MD [facility]        Today's Diagnoses     Routine general medical examination at a health care facility    -  1    Postinflammatory pulmonary fibrosis (H)        Sjogren's syndrome, with unspecified organ involvement (H)        Chronic kidney disease, stage III (moderate) (H)        Mild persistent asthma, uncomplicated        Chronic cough        Hypothyroidism, unspecified type        Screening for diabetes mellitus        Need for prophylactic vaccination and inoculation against influenza        Lump or mass in breast        Seborrheic keratoses          Care Instructions      Preventive Health Recommendations    Female Ages 65 +    Yearly exam:     See your health care provider every year in order to  o Review health changes.   o Discuss preventive care.    o Review your medicines if your doctor has prescribed any.      You no longer need a yearly Pap test unless you've had an abnormal Pap test in the past 10 years. If you have vaginal symptoms, such as bleeding or discharge, be sure to talk with your provider about a Pap test.      Every 1 to 2 years, have a mammogram.  If you are over 69, talk with your health care provider about whether or not you want to continue having screening mammograms.      Every 10 years, have a colonoscopy. Or, have a yearly FIT test (stool test). These exams will check for colon cancer.       Have a cholesterol test every 5 years, or more often if your doctor advises it.       Have a diabetes test (fasting glucose) every three years. If you are at risk for diabetes, you should have this test more often.       At age 65, have a bone density scan (DEXA) to check for osteoporosis (brittle bone disease).    Shots:    Get a  flu shot each year.    Get a tetanus shot every 10 years.    Talk to your doctor about your pneumonia vaccines. There are now two you should receive - Pneumovax (PPSV 23) and Prevnar (PCV 13).    Talk to your pharmacist about the shingles vaccine.    Talk to your doctor about the hepatitis B vaccine.    Nutrition:     Eat at least 5 servings of fruits and vegetables each day.      Eat whole-grain bread, whole-wheat pasta and brown rice instead of white grains and rice.      Get adequate Calcium and Vitamin D.     Lifestyle    Exercise at least 150 minutes a week (30 minutes a day, 5 days a week). This will help you control your weight and prevent disease.      Limit alcohol to one drink per day.      No smoking.       Wear sunscreen to prevent skin cancer.       See your dentist twice a year for an exam and cleaning.      See your eye doctor every 1 to 2 years to screen for conditions such as glaucoma, macular degeneration and cataracts.          Follow-ups after your visit        Who to contact     If you have questions or need follow up information about today's clinic visit or your schedule please contact [facility] directly at [phone].  Normal or non-critical lab and imaging results will be communicated to you by Rankuhart, letter or phone within 4 business days after the clinic has received the results. If you do not hear from us within 7 days, please contact the clinic through InfiKnot or phone. If you have a critical or abnormal lab result, we will notify you by phone as soon as possible.  Submit refill requests through MedArk[employer] or call your pharmacy and they will forward the refill request to us. Please allow 3 business days for your refill to be completed.          Additional Information About Your Visit        MedArkive Information     MedArkive lets you send messages to your doctor, view your test results, renew your prescriptions, schedule appointments and more. To sign up, go to  "[email or web address]/MyChart . Click on \"Log in\" on the left side of the screen, which will take you to the Welcome page. Then click on \"Sign up Now\" on the right side of the page.     You will be asked to enter the access code listed below, as well as some personal information. Please follow the directions to create your username and password.     Your access code is: [email or web address]  Expires: 2019 11:31 AM     Your access code will  in 90 days. If you need help or a new code, please call your [facility] clinic or [phone].        Care EveryWhere ID     This is your Care EveryWhere ID. This could be used by other organizations to access your [facility] medical records  [id number]        Your Vitals Were     Pulse Respirations Height Pulse Oximetry BMI (Body Mass Index)       87 16 1.549 m (5' 1\") 94% 36.24 kg/m2        Blood Pressure from Last 3 Encounters:   10/25/18 124/84   10/23/17 122/52   16 124/86    Weight from Last 3 Encounters:   10/25/18 87 kg (191 lb 12.8 oz)   10/23/17 87.4 kg (192 lb 9.6 oz)   16 85.3 kg (188 lb)              We Performed the Following     ADMIN INFLUENZA (For MEDICARE Patients ONLY) []     CBC with Diff/Plt ([facility])     Comp. Metabolic Panel (14) ([facility])     FLU VACCINE, INCREASED ANTIGEN, PRESV FREE, AGE 65+ [02827]     TSH ([facility])          Today's Medication Changes          These changes are accurate as of 10/25/18 11:31 AM.  If you have any questions, ask your nurse or doctor.               Start taking these medicines.        Dose/Directions    albuterol 108 (90 Base) MCG/ACT inhaler   Commonly known as:  PROAIR HFA/PROVENTIL HFA/VENTOLIN HFA   Used for:  Chronic cough   Started by:  Rosa Oneal MD        Dose:  1-2 puff   Inhale 1-2 puffs into the lungs every 4 hours as needed for shortness of breath / dyspnea   Quantity:  1 Inhaler   Refills:  11         These medicines have changed or have updated prescriptions.        Dose/Directions    " levothyroxine 75 MCG tablet   Commonly known as:  SYNTHROID/LEVOTHROID   This may have changed:  See the new instructions.   Changed by:  Rosa Oneal MD        Dose:  75 mcg   Take 1 tablet (75 mcg) by mouth daily   Quantity:  90 tablet   Refills:  3            Where to get your medicines      These medications were sent to [facility] [address] [place] [address] [address] AT [address] [place] [address] & [facility]  [address] [place] [address]    Hours:  24-hours Phone:  [phone]     albuterol 108 (90 Base) MCG/ACT inhaler    celecoxib 200 MG capsule    levothyroxine 75 MCG tablet                Primary Care Provider Office Phone # Fax #    Rosa Oneal [phone] [address] [facility] [address]        Equal Access to Services     [facility]: Hadii aad ku hadasho Soomaali, waaxda luqadaha, qaybta kaalmada adeegyada, waxay idiin hayaan adeeg kharagrace laYaaaan . So [facility] [phone].    ATENCIÓN: Si habla español, tiene a wong disposición servicios gratuitos de asistencia lingüística. Llame al [phone].    We comply with applicable federal civil rights laws and [place] laws. We do not discriminate on the basis of race, color, national origin, age, disability, sex, sexual orientation, or gender identity.            Thank you!     Thank you for choosing [facility]  for your care. Our goal is always to provide you with excellent care. Hearing back from our patients is one way we can continue to improve our services. Please take a few minutes to complete the written survey that you may receive in the mail after your visit with us. Thank you!             Your Updated Medication List - Protect others around you: Learn how to safely use, store and throw away your medicines at www.disposemymeds.org.          This list is accurate as of 10/25/18 11:31 AM.  Always use your most recent med list.                   Brand Name Dispense Instructions for  use Diagnosis    albuterol 108 (90 Base) MCG/ACT inhaler    PROAIR HFA/PROVENTIL HFA/VENTOLIN HFA    1 Inhaler    Inhale 1-2 puffs into the lungs every 4 hours as needed for shortness of breath / dyspnea    Chronic cough       calcium carbonate 500 mg-vitamin D 200 units 500-200 MG-UNIT per tablet    OSCAL with D;OYSTER SHELL CALCIUM    100 tablet    Take 1 tablet by mouth daily        celecoxib 200 MG capsule    celeBREX    90 capsule    TAKE 1 CAPSULE BY MOUTH AS NEEDED FOR PAIN ON A DAILY BASIS        CENTRUM SILVER PO      Take 1 tablet by mouth daily.        levothyroxine 75 MCG tablet    SYNTHROID/LEVOTHROID    90 tablet    Take 1 tablet (75 mcg) by mouth daily        vitamin B complex with vitamin C Tabs tablet      Take 1 tablet by mouth daily        VITAMIN C CR PO      Take  by mouth daily.

## 2018-10-26 LAB
ALBUMIN SERPL-MCNC: 4.3 G/DL (ref 3.2–4.6)
ALBUMIN/GLOB SERPL: 1.4 {RATIO} (ref 1.2–2.2)
ALP SERPL-CCNC: 54 IU/L (ref 39–117)
ALT SERPL-CCNC: 19 IU/L (ref 0–32)
AST SERPL-CCNC: 31 IU/L (ref 0–40)
BILIRUB SERPL-MCNC: 0.4 MG/DL (ref 0–1.2)
BUN SERPL-MCNC: 23 MG/DL (ref 10–36)
BUN/CREATININE RATIO: 20 (ref 12–28)
CALCIUM SERPL-MCNC: 9.6 MG/DL (ref 8.7–10.3)
CHLORIDE SERPLBLD-SCNC: 100 MMOL/L (ref 96–106)
CREAT SERPL-MCNC: 1.17 MG/DL (ref 0.57–1)
EGFR IF AFRICN AM: 47 ML/MIN/1.73
EGFR IF NONAFRICN AM: 41 ML/MIN/1.73
GLOBULIN, TOTAL: 3.1 G/DL (ref 1.5–4.5)
GLUCOSE SERPL-MCNC: 96 MG/DL (ref 65–99)
POTASSIUM SERPL-SCNC: 4.2 MMOL/L (ref 3.5–5.2)
PROT SERPL-MCNC: 7.4 G/DL (ref 6–8.5)
SODIUM SERPL-SCNC: 141 MMOL/L (ref 134–144)
TOTAL CO2: 24 MMOL/L (ref 20–29)
TSH BLD-ACNC: 4.57 UIU/ML (ref 0.45–4.5)

## 2018-10-26 ASSESSMENT — ASTHMA QUESTIONNAIRES: ACT_TOTALSCORE: 19

## 2018-10-29 ENCOUNTER — HOSPITAL ENCOUNTER (OUTPATIENT)
Dept: MAMMOGRAPHY | Facility: CLINIC | Age: 83
Discharge: HOME | End: 2018-10-29
Attending: FAMILY MEDICINE | Admitting: FAMILY MEDICINE
Payer: MEDICARE

## 2018-10-29 ENCOUNTER — HOSPITAL ENCOUNTER (OUTPATIENT)
Dept: MAMMOGRAPHY | Facility: CLINIC | Age: 83
End: 2018-10-29
Attending: FAMILY MEDICINE
Payer: MEDICARE

## 2018-10-29 DIAGNOSIS — N63.0 LUMP OR MASS IN BREAST: ICD-10-CM

## 2018-10-29 DIAGNOSIS — N64.89 OTHER SPECIFIED DISORDERS OF BREAST: ICD-10-CM

## 2018-10-29 DIAGNOSIS — N63.13 LUMP IN LOWER OUTER QUADRANT OF RIGHT BREAST: ICD-10-CM

## 2018-10-29 PROCEDURE — 76642 ULTRASOUND BREAST LIMITED: CPT | Mod: RT

## 2018-10-29 PROCEDURE — 77066 DX MAMMO INCL CAD BI: CPT

## 2018-11-01 ENCOUNTER — OFFICE VISIT (OUTPATIENT)
Dept: FAMILY MEDICINE | Facility: CLINIC | Age: 83
End: 2018-11-01

## 2018-11-01 VITALS
BODY MASS INDEX: 36.24 KG/M2 | SYSTOLIC BLOOD PRESSURE: 118 MMHG | HEART RATE: 84 BPM | WEIGHT: 191.8 LBS | DIASTOLIC BLOOD PRESSURE: 64 MMHG | RESPIRATION RATE: 16 BRPM

## 2018-11-01 DIAGNOSIS — R79.89 ELEVATED TSH: ICD-10-CM

## 2018-11-01 DIAGNOSIS — L98.9 SKIN LESION: ICD-10-CM

## 2018-11-01 DIAGNOSIS — E03.9 HYPOTHYROIDISM, UNSPECIFIED TYPE: Primary | ICD-10-CM

## 2018-11-01 DIAGNOSIS — L82.1 SEBORRHEIC KERATOSES: ICD-10-CM

## 2018-11-01 PROCEDURE — 99212 OFFICE O/P EST SF 10 MIN: CPT | Mod: 25 | Performed by: FAMILY MEDICINE

## 2018-11-01 PROCEDURE — 17110 DESTRUCTION B9 LES UP TO 14: CPT | Performed by: FAMILY MEDICINE

## 2018-11-01 RX ORDER — LEVOTHYROXINE SODIUM 88 UG/1
TABLET ORAL
Qty: 90 TABLET | Refills: 1 | Status: SHIPPED | OUTPATIENT
Start: 2018-11-01 | End: 2019-10-22 | Stop reason: SINTOL

## 2018-11-01 NOTE — MR AVS SNAPSHOT
"              After Visit Summary   2018    June Huddleston    MRN: [id number]           Patient Information     Date Of Birth          1928        Visit Information        Provider Department      2018 11:30 AM Roas Oneal MD [facility]        Today's Diagnoses     Hypothyroidism, unspecified type    -  1    Elevated TSH        Skin lesion        Hemangioma        Seborrheic keratoses           Follow-ups after your visit        Who to contact     If you have questions or need follow up information about today's clinic visit or your schedule please contact [facility] directly at [phone].  Normal or non-critical lab and imaging results will be communicated to you by MyChart, letter or phone within 4 business days after the clinic has received the results. If you do not hear from us within 7 days, please contact the clinic through Jumper Networkshart or phone. If you have a critical or abnormal lab result, we will notify you by phone as soon as possible.  Submit refill requests through [employer] or call your pharmacy and they will forward the refill request to us. Please allow 3 business days for your refill to be completed.          Additional Information About Your Visit        MyChart Information     [employer] lets you send messages to your doctor, view your test results, renew your prescriptions, schedule appointments and more. To sign up, go to [email or web address] . Click on \"Log in\" on the left side of the screen, which will take you to the Welcome page. Then click on \"Sign up Now\" on the right side of the page.     You will be asked to enter the access code listed below, as well as some personal information. Please follow the directions to create your username and password.     Your access code is: [email or web address]  Expires: 2019 11:31 AM     Your access code will  in 90 days. If you need help or a new code, please call your [facility] clinic or [phone].   "      Care EveryWhere ID     This is your Care EveryWhere ID. This could be used by other organizations to access your [facility] medical records  [id number]        Your Vitals Were     Pulse Respirations BMI (Body Mass Index)             84 16 36.24 kg/m2          Blood Pressure from Last 3 Encounters:   11/01/18 118/64   10/25/18 124/84   10/23/17 122/52    Weight from Last 3 Encounters:   11/01/18 87 kg (191 lb 12.8 oz)   10/25/18 87 kg (191 lb 12.8 oz)   10/23/17 87.4 kg (192 lb 9.6 oz)              We Performed the Following     C DESTROY < 15 BENIGN SKIN LESIONS          Today's Medication Changes          These changes are accurate as of 11/1/18 12:52 PM.  If you have any questions, ask your nurse or doctor.               These medicines have changed or have updated prescriptions.        Dose/Directions    * levothyroxine 75 MCG tablet   Commonly known as:  SYNTHROID/LEVOTHROID   This may have changed:  Another medication with the same name was added. Make sure you understand how and when to take each.   Changed by:  Rosa Oneal MD        Dose:  75 mcg   Take 1 tablet (75 mcg) by mouth daily   Quantity:  90 tablet   Refills:  3       * levothyroxine 88 MCG tablet   Commonly known as:  SYNTHROID/LEVOTHROID   This may have changed:  You were already taking a medication with the same name, and this prescription was added. Make sure you understand how and when to take each.   Used for:  Hypothyroidism, unspecified type, Elevated TSH   Changed by:  Rosa Oneal MD        Take 88 mcg Tues, Thurs, Sat, Sun. Take 75 mcg on Mon, Wed, Friday.   Quantity:  90 tablet   Refills:  1       * Notice:  This list has 2 medication(s) that are the same as other medications prescribed for you. Read the directions carefully, and ask your doctor or other care provider to review them with you.         Where to get your medicines      These medications were sent to [facility] [address] [place], [place] - [address] AT [address] [place] [address]  [address] [place] [address]    Hours:  24-hours Phone:  [phone]     levothyroxine 88 MCG tablet                Primary Care Provider Office Phone # Fax #    Rosa Bridget Oneal [phone] [address]  [facility] [address]        Equal Access to Services     [facility] : Hadii aad ku hadasho Soomaali, waaxda luqadaha, qaybta kaalmada adeegyada, waxay idiin hayaan adeeg kharash la'aan . So [facility] [phone].    ATENCIÓN: Si habla español, tiene a wong disposición servicios gratuitos de asistencia lingüística. [facility] [phone].    We comply with applicable federal civil rights laws and [place] laws. We do not discriminate on the basis of race, color, national origin, age, disability, sex, sexual orientation, or gender identity.            Thank you!     Thank you for choosing [facility]  for your care. Our goal is always to provide you with excellent care. Hearing back from our patients is one way we can continue to improve our services. Please take a few minutes to complete the written survey that you may receive in the mail after your visit with us. Thank you!             Your Updated Medication List - Protect others around you: Learn how to safely use, store and throw away your medicines at www.disposemymeds.org.          This list is accurate as of 11/1/18 12:52 PM.  Always use your most recent med list.                   Brand Name Dispense Instructions for use Diagnosis    albuterol 108 (90 Base) MCG/ACT inhaler    PROAIR HFA/PROVENTIL HFA/VENTOLIN HFA    1 Inhaler    Inhale 1-2 puffs into the lungs every 4 hours as needed for shortness of breath / dyspnea    Chronic cough       calcium carbonate 500 mg-vitamin D 200 units 500-200 MG-UNIT per tablet    OSCAL with D;OYSTER SHELL CALCIUM    100 tablet    Take 1 tablet by mouth daily        celecoxib 200 MG capsule    celeBREX    90 capsule    TAKE 1 CAPSULE  BY MOUTH AS NEEDED FOR PAIN ON A DAILY BASIS        CENTRUM SILVER PO      Take 1 tablet by mouth daily.        * levothyroxine 75 MCG tablet    SYNTHROID/LEVOTHROID    90 tablet    Take 1 tablet (75 mcg) by mouth daily        * levothyroxine 88 MCG tablet    SYNTHROID/LEVOTHROID    90 tablet    Take 88 mcg Tues, Thurs, Sat, Sun. Take 75 mcg on Mon, Wed, Friday.    Hypothyroidism, unspecified type, Elevated TSH       vitamin B complex with vitamin C Tabs tablet      Take 1 tablet by mouth daily        VITAMIN C CR PO      Take  by mouth daily.        * Notice:  This list has 2 medication(s) that are the same as other medications prescribed for you. Read the directions carefully, and ask your doctor or other care provider to review them with you.

## 2018-11-01 NOTE — PROGRESS NOTES
Problem(s) Oriented visit      SUBJECTIVE:                                                    June Huddleston is a 89 year old female who presents to clinic today for:  Patient presents with:  Light/dark Spots: Face, torso    Pt presents for reassessment and treatment of lesions on her face and torso.   Has a 5 mm lesion on her R lateral breast - catches on her bra and becomes irritated.   Also has SKs on her back that catch on her bra and shirt. They bother her when she leans back against a hard surface. Few SK on lateral aspect of forehead. Hits these with a hairbrush.   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     Problem list, Medication list, Allergies, and Medical/Social/Surgical histories reviewed in [facility] and updated as appropriate.     ROS:  5 point ROS completed and negative except noted above, including Gen, CV, Resp, GI, MS    Histories:   Patient Active Problem List   Diagnosis     Allergic rhinitis     Postinflammatory pulmonary fibrosis (H)     Sensorineural hearing loss     Generalized osteoarthrosis, unspecified site     Chronic rhinitis     Mild persistent asthma     Stricture and stenosis of esophagus     Diaphragmatic hernia     Calculus of gallbladder     Chronic kidney disease, stage III (moderate) (H)     History of lung biopsy     Bronchiectasis without complication (H)     Sjogren's syndrome, with unspecified organ involvement (H)     Other chronic sinusitis     Onychomycosis     Past Medical History:   Diagnosis Date     Bronchiectasis (H) 2014 Dr. Cirilo Pretty    Mucus impaction on CT scan- treated with mucolytic, broncoscopy in 2015 neg cultures     Calculus of gallbladder without mention of cholecystitis or obstruction      Chronic rhinitis      Generalized osteoarthrosis, unspecified site      History of lung biopsy 11-18-15    Dr Cirilo Belle- nodule     Migraine, unspecified, without mention of intractable migraine without mention of status  migrainosus      Mild persistent asthma      Pneumonia, organism unspecified(486)      Postinflammatory pulmonary fibrosis (H)      Sensorineural hearing loss, unspecified      Sicca syndrome (H)      Sjoegren syndrome (H)      Stricture and stenosis of esophagus      Unspecified hypothyroidism      Past Surgical History:   Procedure Laterality Date     BRONCHOSCOPY FLEXIBLE  6/11/2011    Procedure:BRONCHOSCOPY FLEXIBLE; Surgeon:DILLON BRADFORD; Location:[facility] OR     BRONCHOSCOPY RIGID  6/11/2011    Procedure:BRONCHOSCOPY RIGID; Surgeon:DILLON BRADFORD; Location:[facility] OR     C NONSPECIFIC PROCEDURE      urethral dilatation     CATARACT IOL, RT/LT      Cataract IOL RT/LT     ENDOBRONCHIAL ULTRASOUND FLEXIBLE  6/11/2011    Procedure:ENDOBRONCHIAL ULTRASOUND FLEXIBLE; Flexible Bronchoscopy. Endobronchial Ultra Sound; Surgeon:DILLON BRADOFRD; Location: OR     Social History   Substance Use Topics     Smoking status: Former Smoker     Quit date: 10/4/1965     Smokeless tobacco: Never Used     Alcohol use 0.0 oz/week     0 Standard drinks or equivalent per week      Comment: special occasions     Family History   Problem Relation Age of Onset     Unknown/Adopted Mother      Unknown/Adopted Father      Unknown/Adopted Maternal Grandmother      Unknown/Adopted Maternal Grandfather      Unknown/Adopted Paternal Grandmother      Unknown/Adopted Paternal Grandfather      Unknown/Adopted Brother      Unknown/Adopted Sister      Unknown/Adopted No family hx of      Unknown/Adopted Son      Unknown/Adopted Daughter      Unknown/Adopted Other        OBJECTIVE:                                                    /64  Pulse 84  Resp 16  Wt 87 kg (191 lb 12.8 oz)  BMI 36.24 kg/m2  Body mass index is 36.24 kg/(m^2).  Appears younger than her stated age.   Gen: Cooperative. No acute distress.   Skin: Warm and well perfused. Wearing less facial makeup today to facilitate treating w/ liquid nitrogen.  Multiple SKs present.   Neurologic: Alert, oriented x3, nonfocal.   Psych:  Mood and affect are appropriate. She appears able to give consent.      ASSESSMENT/PLAN:                                                      June was seen today for light/dark spots.    Diagnoses and all orders for this visit:    Hypothyroidism, unspecified type  -     levothyroxine (SYNTHROID/LEVOTHROID) 88 MCG tablet; Take 88 mcg Tues, Thurs, Sat, Sun. Take 75 mcg on Mon, Wed, Friday.    Elevated TSH  -     levothyroxine (SYNTHROID/LEVOTHROID) 88 MCG tablet; Take 88 mcg Tues, Thurs, Sat, Sun. Take 75 mcg on Mon, Wed, Friday.   TSH was elevated w/ her labs from annual physical. She had just filled rx for 75 mcg pills. Will increase dose slightly - want to be cautious w/ 89 yo F. She is able to recite the plan back to me today.    Recheck TSH in 4 weeks.    We spent 10 minutes developing and reviewing the plan for thyroid mgmt in addition to the time allocated for skin lesion destruction.     Seborrheic keratoses  -     C DESTROY < 15 BENIGN SKIN LESIONS   After obtaining verbal informed consent, treated lesions on face, back, R lateral breast w/ 3-4 freeze thaw rounds of liquid nitrogen. Tolerated this process very well. No evidence of vasovagal activity.    Discussed wound care. Call if any complications.     The following health maintenance items are reviewed in Epic and correct as of today:  Health Maintenance   Topic Date Due     ADVANCE DIRECTIVE PLANNING Q5 YRS  04/26/2017     ASTHMA CONTROL TEST Q6 MOS  04/25/2019     TSH Q1 YEAR  10/25/2019     MEDICARE ANNUAL WELLNESS VISIT  10/25/2019     ASTHMA ACTION PLAN Q1 YR  10/25/2019     FALL RISK ASSESSMENT  10/25/2019     PHQ-2 Q1 YR  10/25/2019     TETANUS IMMUNIZATION (SYSTEM ASSIGNED)  10/31/2022     PNEUMOCOCCAL  Completed     INFLUENZA VACCINE  Completed       Rosa Oneal MD  Family Medicine    For any issues, please call my office  [facility] at [phone].

## 2019-08-15 DIAGNOSIS — E03.9 HYPOTHYROIDISM, UNSPECIFIED TYPE: ICD-10-CM

## 2019-08-15 NOTE — TELEPHONE ENCOUNTER
LEVOTHYROXINE  LOV 11/1/18  LAST LABS 10/25/18    CPX (FASTING) SCHEDULED FOR 10/22/19    TSH   Date Value Ref Range Status   07/13/2011 3.30 0.4 - 5.0 mU/L Final

## 2019-08-16 RX ORDER — LEVOTHYROXINE SODIUM 75 UG/1
75 TABLET ORAL DAILY
Qty: 90 TABLET | Refills: 3 | Status: SHIPPED | OUTPATIENT
Start: 2019-08-16 | End: 2020-10-30

## 2019-10-22 ENCOUNTER — OFFICE VISIT (OUTPATIENT)
Dept: FAMILY MEDICINE | Facility: CLINIC | Age: 84
End: 2019-10-22

## 2019-10-22 VITALS
DIASTOLIC BLOOD PRESSURE: 62 MMHG | WEIGHT: 192 LBS | SYSTOLIC BLOOD PRESSURE: 128 MMHG | OXYGEN SATURATION: 95 % | HEART RATE: 93 BPM | BODY MASS INDEX: 36.28 KG/M2

## 2019-10-22 DIAGNOSIS — Z00.00 ENCOUNTER FOR MEDICARE ANNUAL WELLNESS EXAM: Primary | ICD-10-CM

## 2019-10-22 DIAGNOSIS — E66.01 MORBID OBESITY (H): ICD-10-CM

## 2019-10-22 DIAGNOSIS — E03.9 HYPOTHYROIDISM, UNSPECIFIED TYPE: ICD-10-CM

## 2019-10-22 DIAGNOSIS — R79.89 ELEVATED TSH: ICD-10-CM

## 2019-10-22 PROBLEM — J32.8 OTHER CHRONIC SINUSITIS: Status: RESOLVED | Noted: 2017-10-23 | Resolved: 2019-10-22

## 2019-10-22 LAB
% GRANULOCYTES: 73.1 % (ref 42.2–75.2)
HCT VFR BLD AUTO: 45.4 % (ref 35–46)
HEMOGLOBIN: 14.8 G/DL (ref 11.8–15.5)
LYMPHOCYTES NFR BLD AUTO: 21.2 % (ref 20.5–51.1)
MCH RBC QN AUTO: 29.7 PG (ref 27–31)
MCHC RBC AUTO-ENTMCNC: 32.7 G/DL (ref 33–37)
MCV RBC AUTO: 90.8 FL (ref 80–100)
MONOCYTES NFR BLD AUTO: 5.7 % (ref 1.7–9.3)
PLATELET # BLD AUTO: 273 K/UL (ref 140–450)
RBC # BLD AUTO: 5 X10/CMM (ref 3.7–5.2)
WBC # BLD AUTO: 7.2 X10/CMM (ref 3.8–11)

## 2019-10-22 PROCEDURE — 36415 COLL VENOUS BLD VENIPUNCTURE: CPT | Performed by: FAMILY MEDICINE

## 2019-10-22 PROCEDURE — 85025 COMPLETE CBC W/AUTO DIFF WBC: CPT | Performed by: FAMILY MEDICINE

## 2019-10-22 PROCEDURE — 99212 OFFICE O/P EST SF 10 MIN: CPT | Mod: 25 | Performed by: FAMILY MEDICINE

## 2019-10-22 PROCEDURE — G0439 PPPS, SUBSEQ VISIT: HCPCS | Performed by: FAMILY MEDICINE

## 2019-10-22 NOTE — PROGRESS NOTES
"  SUBJECTIVE:   June Huddleston is a 89 year old female who presents for Preventive Visit.    Are you in the first 12 months of your Medicare Part B coverage?  No    Physical Health:    In general, how would you rate your overall physical health? poor    Outside of work, how many days during the week do you exercise? none    Outside of work, approximately how many minutes a day do you exercise?not applicable    If you drink alcohol do you typically have >3 drinks per day or >7 drinks per week? No    Do you usually eat at least 4 servings of fruit and vegetables a day, include whole grains & fiber and avoid regularly eating high fat or \"junk\" foods? NO    Do you have any problems taking medications regularly?  No    Do you have any side effects from medications?     Needs assistance for the following daily activities: no assistance needed    Which of the following safety concerns are present in your home?  none identified     Hearing impairment: Yes, Difficulty following a conversation in a noisy restaurant or crowded room.    In the past 6 months, have you been bothered by leaking of urine? yes    Mental Health:    In general, how would you rate your overall mental or emotional health? good  PHQ-2 Score:      Do you feel safe in your environment? Yes    Do you have a Health Care Directive? Yes: Advance Directive has been received and scanned.    Additional concerns to address?  No    Fall risk: yes  reviewed       Cognitive Screenin) Repeat 3 items (Leader, Season, Table)    2) Clock draw: NORMAL  3) 3 item recall: Recalls 2 objects   Results: NORMAL clock, 1-2 items recalled: COGNITIVE IMPAIRMENT LESS LIKELY    Mini-CogTM Copyright MYRIAM Ward. Licensed by the author for use in [facility]; reprinted with permission (ruy@.[facility]). All rights reserved.      Do you have sleep apnea, excessive snoring or daytime drowsiness?: no    Hypothyroidism Follow-up      Since last visit, patient describes the " "following symptoms: Weight stable, no hair loss, no skin changes, no constipation, no loose stools      Reviewed and updated as needed this visit by clinical staff         Reviewed and updated as needed this visit by Provider        Social History     Tobacco Use     Smoking status: Former Smoker     Last attempt to quit: 10/4/1965     Years since quittin.0     Smokeless tobacco: Never Used   Substance Use Topics     Alcohol use: Yes     Alcohol/week: 0.0 standard drinks     Comment: special occasions                           Current providers sharing in care for this patient include:   Patient Care Team:  Dawood Rausch MD as PCP - General (Family Practice)  Rosa Oneal MD as Assigned PCP    The following health maintenance items are reviewed in Epic and correct as of today:  Health Maintenance   Topic Date Due     ZOSTER IMMUNIZATION (2 of 3) 2013     ADVANCE CARE PLANNING  2017     PHQ-2  2019     ASTHMA CONTROL TEST  2019     INFLUENZA VACCINE (1) 2019     MEDICARE ANNUAL WELLNESS VISIT  10/25/2019     TSH W/FREE T4 REFLEX  10/25/2019     ASTHMA ACTION PLAN  10/25/2019     FALL RISK ASSESSMENT  10/25/2019     DTAP/TDAP/TD IMMUNIZATION (4 - Td) 10/31/2022     PNEUMOCOCCAL IMMUNIZATION 65+ LOW/MEDIUM RISK  Completed     IPV IMMUNIZATION  Aged Out     MENINGITIS IMMUNIZATION  Aged Out     Lab work is in process  Pneumonia Vaccine:Adults age 65+ who received Pneumovax (PPSV23) at 65 years or older: Should be given PCV13 > 1 year after their most recent PPSV23    ROS:  Constitutional, HEENT, cardiovascular, pulmonary, gi and gu systems are negative, except as otherwise noted.    OBJECTIVE:   There were no vitals taken for this visit. Estimated body mass index is 36.24 kg/m  as calculated from the following:    Height as of 10/25/18: 1.549 m (5' 1\").    Weight as of 18: 87 kg (191 lb 12.8 oz).  EXAM:   GENERAL APPEARANCE: healthy, alert and no distress  EYES: " "Eyes grossly normal to inspection, PERRL and conjunctivae and sclerae normal  HENT: ear canals and TM's normal, nose and mouth without ulcers or lesions, oropharynx clear and oral mucous membranes moist  NECK: no adenopathy, no asymmetry, masses, or scars and thyroid normal to palpation  RESP: lungs clear to auscultation - no rales, rhonchi or wheezes  CV: regular rate and rhythm, normal S1 S2, no S3 or S4, no murmur, click or rub, no peripheral edema and peripheral pulses strong  ABDOMEN: soft, nontender, no hepatosplenomegaly, no masses and bowel sounds normal  MS: no musculoskeletal defects are noted and gait is age appropriate without ataxia  SKIN: no suspicious lesions or rashes  NEURO: Normal strength and tone, sensory exam grossly normal, mentation intact and speech normal  PSYCH: mentation appears normal and affect normal/bright    Diagnostic Test Results:  Labs reviewed in Epic    ASSESSMENT / PLAN:       ICD-10-CM    1. Encounter for Medicare annual wellness exam Z00.00    2. Elevated TSH R79.89    3. Hypothyroidism, unspecified type E03.9 TSH ([facility])     Thyroxine (T4) Free  Direct  S ([facility])     Basic Metabolic Panel (8) ([facility])     CBC with Diff/Plt ([facility])   4. Morbid obesity (H) E66.01        End of Life Planning:  Patient currently has an advanced directive: Yes.  Practitioner is supportive of decision.    COUNSELING:  Reviewed preventive health counseling, as reflected in patient instructions  Special attention given to:       Regular exercise       Healthy diet/nutrition    Estimated body mass index is 36.24 kg/m  as calculated from the following:    Height as of 10/25/18: 1.549 m (5' 1\").    Weight as of 11/1/18: 87 kg (191 lb 12.8 oz).    Weight management plan: Discussed healthy diet and exercise guidelines     reports that she quit smoking about 54 years ago. She has never used smokeless tobacco.      Appropriate preventive services were discussed with this patient, including applicable " screening as appropriate for cardiovascular disease, diabetes, osteopenia/osteoporosis, and glaucoma.  As appropriate for age/gender, discussed screening for colorectal cancer, prostate cancer, breast cancer, and cervical cancer. Checklist reviewing preventive services available has been given to the patient.    Reviewed patients plan of care and provided an AVS. The Basic Care Plan (routine screening as documented in Health Maintenance) for June meets the Care Plan requirement. This Care Plan has been established and reviewed with the Patient and spouse    Counseling Resources:  ATP IV Guidelines  Pooled Cohorts Equation Calculator  Breast Cancer Risk Calculator  FRAX Risk Assessment  ICSI Preventive Guidelines  Dietary Guidelines for Americans, 2010  [employer] - [employer] Its [employer]'s MyPlate  ASA Prophylaxis  Lung CA Screening    Dawood Rausch MD  [facility]    She is at risk for falling and has been provided with information to reduce the risk of falling at home.

## 2019-10-22 NOTE — LETTER
[facility] [address] [place]  [address]  Phone: [phone]    October 24, 2019      June Burroughs [facility] [address]  [facility] [address]              Dear June,    The results from your recent visit showed acceptable labs.        Sincerely,     Dawood Rausch M.D.    Results for orders placed or performed in visit on 10/22/19   TSH ([facility])   Result Value Ref Range    TSH 1.870 0.450 - 4.500 uIU/mL    Narrative    Performed at:  01 - [facility] [address], [place], [place]  [id number]  : Isaak Noel MD, Phone:  [phone]   Thyroxine (T4) Free  Direct  S ([facility])   Result Value Ref Range    T4 Free 1.56 0.82 - 1.77 ng/dL    Narrative    Performed at:  01 - [facility] [address], [place], [place]  [id number]  : Isaak Noel MD, Phone:  [phone]   Basic Metabolic Panel (8) ([facility])   Result Value Ref Range    Glucose 108 (H) 65 - 99 mg/dL    Urea Nitrogen 15 10 - 36 mg/dL    Creatinine 0.95 0.57 - 1.00 mg/dL    eGFR If NonAfricn Am 53 (L) >59 mL/min/1.73    eGFR If Africn Am 61 >59 mL/min/1.73    BUN/Creatinine Ratio 16 12 - 28    Sodium 140 134 - 144 mmol/L    Potassium 4.4 3.5 - 5.2 mmol/L    Chloride 101 96 - 106 mmol/L    Total CO2 24 20 - 29 mmol/L    Calcium 9.6 8.7 - 10.3 mg/dL    Narrative    Performed at:  01 - [facility] [address], [place], [place]  [id number]  : Isaak Noel MD, Phone:  [phone]   CBC with Diff/Plt ([facility])   Result Value Ref Range    WBC x10/cmm 7.2 3.8 - 11.0 x10/cmm    % Lymphocytes 21.2 20.5 - 51.1 %    % Monocytes 5.7 1.7 - 9.3 %    % Granulocytes 73.1 42.2 - 75.2 %    RBC x10/cmm 5.00 3.7 - 5.2 x10/cmm    Hemoglobin 14.8 11.8 - 15.5 g/dl    Hematocrit 45.4 35 - 46 %    MCV 90.8 80 - 100 fL    MCH 29.7 27.0 - 31.0 pg    MCHC 32.7 (A) 33.0 - 37.0 g/dL    Platelet Count 273 140 - 450 K/uL

## 2019-10-22 NOTE — PATIENT INSTRUCTIONS
Patient Education   Personalized Prevention Plan  You are due for the preventive services outlined below.  Your care team is available to assist you in scheduling these services.  If you have already completed any of these items, please share that information with your care team to update in your medical record.  Health Maintenance Due   Topic Date Due     Zoster (Shingles) Vaccine (2 of 3) 12/24/2013     Discuss Advance Care Planning  04/26/2017     PHQ-2  01/01/2019     Asthma Control Test  04/25/2019     Flu Vaccine (1) 09/01/2019     Annual Wellness Visit  10/25/2019     Thyroid Function Lab  10/25/2019     Asthma Action Plan - yearly  10/25/2019     FALL RISK ASSESSMENT  10/25/2019        Patient Education   Personalized Prevention Plan  You are due for the preventive services outlined below.  Your care team is available to assist you in scheduling these services.  If you have already completed any of these items, please share that information with your care team to update in your medical record.  Health Maintenance Due   Topic Date Due     Zoster (Shingles) Vaccine (2 of 3) 12/24/2013     Discuss Advance Care Planning  04/26/2017     PHQ-2  01/01/2019     Asthma Control Test  04/25/2019     Flu Vaccine (1) 09/01/2019     Annual Wellness Visit  10/25/2019     Asthma Action Plan - yearly  10/25/2019       Preventing Falls in the Home  An adult or child can fall for many reasons. If you are an older adult, you may fall because your reaction time slows down. Your muscles and joints may get stiff, weak, or less flexible because of illness, medicines, or a physical condition. These things can also make a child more likely to fall or be injured in a fall.  Other health problems that make falls more likely include:    Arthritis    Dizziness or lightheadedness when you get out of bed (orthostatic hypotension)    History of a stroke    Dizziness    Anemia    Certain medicines taken for mental illness    Problems with  balance or gait    History of falls with or without an injury    Changes in vision (vision impairment)    Changes in thinking skills and memory (cognitive impairment)  Injuries from a fall can include broken bones, dislocated joints, and cuts. When these injuries are serious enough, they can make it impossible for you or a child who is injured in a fall to live on his or her own.  Prevention tips  To help prevent falls and fall-related injuries, follow the tips below.   Floors  Make floors safer by doing the following:     Put nonskid pads under area rugs.    Remove throw rugs.    Replace worn floor coverings.    Tack carpets firmly to each step on carpeted stairs. Put nonskid strips on the edges of uncarpeted stairs.    Keep floors and stairs free of clutter and cords.    Arrange furniture so there are clear pathways.    Clean up any spills right away.    Wear shoes that fit.  Bathrooms    Make bathrooms safer by doing the following:     Install grab bars in the tub or shower.    Apply nonskid strips or put a nonskid rubber mat in the tub or shower.    Sit on a bath chair to bathe.    Use bathmats with nonskid backing.  Lighting and the environment  Improve lighting in your home by doing the following:     Keep a flashlight in each room. Or put a lamp next to the bed within easy reach.    Put nightlights in the bedrooms, hallways, kitchen, and bathrooms.    Make sure all stairways have good lighting.    Take your time when going up and down stairs.    Put handrails on both sides of stairs and in walkways for more support. To prevent injury to your wrist or arm, don t use handrails to pull yourself up.    Install grab bars to pull yourself up.    Move or rearrange items that you use often. This will make them easier to find or reach.    Look at your home to find any safety hazards. Especially look at doorways, walkways, and the driveway. Remove or repair any safety problems that you find.  Date Last Reviewed:  8/1/2016 2000-2018 The "[employer]". [address] [place] [address], [place] [address]. All rights reserved. This information is not intended as a substitute for professional medical care. Always follow your healthcare professional's instructions.

## 2019-10-23 LAB
BUN SERPL-MCNC: 15 MG/DL (ref 10–36)
BUN/CREATININE RATIO: 16 (ref 12–28)
CALCIUM SERPL-MCNC: 9.6 MG/DL (ref 8.7–10.3)
CHLORIDE SERPLBLD-SCNC: 101 MMOL/L (ref 96–106)
CREAT SERPL-MCNC: 0.95 MG/DL (ref 0.57–1)
EGFR IF AFRICN AM: 61 ML/MIN/1.73
EGFR IF NONAFRICN AM: 53 ML/MIN/1.73
GLUCOSE SERPL-MCNC: 108 MG/DL (ref 65–99)
POTASSIUM SERPL-SCNC: 4.4 MMOL/L (ref 3.5–5.2)
SODIUM SERPL-SCNC: 140 MMOL/L (ref 134–144)
T4 FREE SERPL-MCNC: 1.56 NG/DL (ref 0.82–1.77)
TOTAL CO2: 24 MMOL/L (ref 20–29)
TSH BLD-ACNC: 1.87 UIU/ML (ref 0.45–4.5)

## 2020-02-10 DIAGNOSIS — R05.3 CHRONIC COUGH: ICD-10-CM

## 2020-02-11 RX ORDER — ALBUTEROL SULFATE 90 UG/1
1-2 AEROSOL, METERED RESPIRATORY (INHALATION) EVERY 4 HOURS PRN
Qty: 1 INHALER | Refills: 11 | Status: SHIPPED | OUTPATIENT
Start: 2020-02-11

## 2020-03-17 ENCOUNTER — TELEPHONE (OUTPATIENT)
Dept: PHARMACY | Facility: PHYSICIAN GROUP | Age: 85
End: 2020-03-17

## 2020-03-17 NOTE — TELEPHONE ENCOUNTER
Patient came to clinic because she received an albuterol inhaler that she is having a lot of difficulty using due to her arthritis. She was ordered any of the albuterol inhalers, so was dispensed the generic albuterol HFA inhaler. Previously she was using the ProAir HFA inhaler and this one is a smaller canister to reach and be able to actuate the device.     Spoke with pharmacy and they re-processed the prescription for ProAir HFA inhaler and it went through for $34 and they can get ready today.     Called patient - she will  new ProAir at the pharmacy.     Arleth Kern, Pharm.D, [facility]  Medication Therapy Management Pharmacist  [phone]

## 2020-03-23 DIAGNOSIS — J84.10 POSTINFLAMMATORY PULMONARY FIBROSIS (H): ICD-10-CM

## 2020-03-23 DIAGNOSIS — M35.00 SJOGREN'S SYNDROME, WITH UNSPECIFIED ORGAN INVOLVEMENT (H): ICD-10-CM

## 2020-03-23 RX ORDER — CELECOXIB 200 MG/1
CAPSULE ORAL
Qty: 90 CAPSULE | Refills: 3 | Status: SHIPPED | OUTPATIENT
Start: 2020-03-23 | End: 2021-04-12

## 2020-10-28 ENCOUNTER — OFFICE VISIT (OUTPATIENT)
Dept: FAMILY MEDICINE | Facility: CLINIC | Age: 85
End: 2020-10-28

## 2020-10-28 VITALS
HEIGHT: 62 IN | BODY MASS INDEX: 35.15 KG/M2 | RESPIRATION RATE: 17 BRPM | DIASTOLIC BLOOD PRESSURE: 72 MMHG | WEIGHT: 191 LBS | TEMPERATURE: 97.6 F | HEART RATE: 88 BPM | OXYGEN SATURATION: 94 % | SYSTOLIC BLOOD PRESSURE: 110 MMHG

## 2020-10-28 DIAGNOSIS — E03.9 HYPOTHYROIDISM, UNSPECIFIED TYPE: ICD-10-CM

## 2020-10-28 DIAGNOSIS — M35.00 SJOGREN'S SYNDROME, WITH UNSPECIFIED ORGAN INVOLVEMENT (H): ICD-10-CM

## 2020-10-28 DIAGNOSIS — B35.1 ONYCHOMYCOSIS: ICD-10-CM

## 2020-10-28 DIAGNOSIS — Z00.00 ENCOUNTER FOR MEDICARE ANNUAL WELLNESS EXAM: Primary | ICD-10-CM

## 2020-10-28 DIAGNOSIS — E66.811 CLASS 1 OBESITY DUE TO EXCESS CALORIES WITH SERIOUS COMORBIDITY AND BODY MASS INDEX (BMI) OF 34.0 TO 34.9 IN ADULT: ICD-10-CM

## 2020-10-28 DIAGNOSIS — E66.09 CLASS 1 OBESITY DUE TO EXCESS CALORIES WITH SERIOUS COMORBIDITY AND BODY MASS INDEX (BMI) OF 34.0 TO 34.9 IN ADULT: ICD-10-CM

## 2020-10-28 DIAGNOSIS — N18.31 STAGE 3A CHRONIC KIDNEY DISEASE (H): ICD-10-CM

## 2020-10-28 DIAGNOSIS — J47.9 BRONCHIECTASIS WITHOUT COMPLICATION (H): ICD-10-CM

## 2020-10-28 PROBLEM — E66.01 MORBID OBESITY (H): Status: RESOLVED | Noted: 2019-10-22 | Resolved: 2020-10-28

## 2020-10-28 PROCEDURE — G0439 PPPS, SUBSEQ VISIT: HCPCS | Performed by: FAMILY MEDICINE

## 2020-10-28 PROCEDURE — 99214 OFFICE O/P EST MOD 30 MIN: CPT | Mod: 25 | Performed by: FAMILY MEDICINE

## 2020-10-28 ASSESSMENT — MIFFLIN-ST. JEOR: SCORE: 1233.59

## 2020-10-28 NOTE — PROGRESS NOTES
"  SUBJECTIVE:   June Huddleston is a 89 year old female who presents for Preventive Visit.    Hypothyroidism: stable, feels well on medication    Sjogrens: stable    Bronchiectasis: pulm notes reviewed.  Her breathing is at baseline.  Declines further workup or follow up    Onychomycosis: severe and bothersome great toenail.  Has seen two podiatrists who did not want to remove nail.    CKD3: stable    Patient has been advised of split billing requirements and indicates understanding: Yes  Are you in the first 12 months of your Medicare Part B coverage?  No    Physical Health:    In general, how would you rate your overall physical health? poor    Outside of work, how many days during the week do you exercise? none    Outside of work, approximately how many minutes a day do you exercise?not applicable    If you drink alcohol do you typically have >3 drinks per day or >7 drinks per week? No    Do you usually eat at least 4 servings of fruit and vegetables a day, include whole grains & fiber and avoid regularly eating high fat or \"junk\" foods? Yes    Do you have any problems taking medications regularly?  No    Do you have any side effects from medications? none    Needs assistance for the following daily activities: no assistance needed    Which of the following safety concerns are present in your home?  none identified     Hearing impairment: Hearing aids    In the past 6 months, have you been bothered by leaking of urine? yes    Mental Health:    In general, how would you rate your overall mental or emotional health? good  PHQ-2 Score:      Do you feel safe in your environment? Yes    Have you ever done Advance Care Planning? (For example, a Health Directive, POLST, or a discussion with a medical provider or your loved ones about your wishes): Yes, patient states has an Advance Care Planning document and will bring a copy to the clinic.    Fall risk:  Fallen 2 or more times in the past year?: Yes  Any fall with injury " in the past year?: No    Cognitive Screenin) Repeat 3 items (Leader, Season, Table)    2) Clock draw: NORMAL  3) 3 item recall: Recalls 3 objects  Results: 3 items recalled: COGNITIVE IMPAIRMENT LESS LIKELY    Mini-CogTM Copyright MYRIAM Ward. Licensed by the author for use in [facility]; reprinted with permission (ruy@[facility]). All rights reserved.      Do you have sleep apnea, excessive snoring or daytime drowsiness?: Daytime Drowsiness    Reviewed and updated as needed this visit by clinical staff  Tobacco  Allergies  Meds              Reviewed and updated as needed this visit by Provider                Social History     Tobacco Use     Smoking status: Former Smoker     Quit date: 10/4/1965     Years since quittin.1     Smokeless tobacco: Never Used   Substance Use Topics     Alcohol use: Yes     Alcohol/week: 0.0 standard drinks     Comment: special occasions                           Current providers sharing in care for this patient include:   Patient Care Team:  Dawood Rausch MD as PCP - General (Family Practice)  Arleth Kern RPH as Pharmacist (Pharmacist)  Rosa Oneal MD as Assigned PCP    The following health maintenance items are reviewed in Epic and correct as of today:  Health Maintenance   Topic Date Due     ZOSTER IMMUNIZATION (2 of 3) 2013     ADVANCE CARE PLANNING  2017     ASTHMA CONTROL TEST  2019     ASTHMA ACTION PLAN  10/25/2019     PHQ-2  2020     FALL RISK ASSESSMENT  10/22/2020     TSH W/FREE T4 REFLEX  10/22/2020     MEDICARE ANNUAL WELLNESS VISIT  10/28/2021     DTAP/TDAP/TD IMMUNIZATION (4 - Td) 10/31/2022     INFLUENZA VACCINE  Completed     Pneumococcal Vaccine: 65+ Years  Completed     Pneumococcal Vaccine: Pediatrics (0 to 5 Years) and At-Risk Patients (6 to 64 Years)  Aged Out     IPV IMMUNIZATION  Aged Out     MENINGITIS IMMUNIZATION  Aged Out     HEPATITIS B IMMUNIZATION  Aged Out     Lab work is in  "process      ROS:  Constitutional, HEENT, cardiovascular, pulmonary, GI, , musculoskeletal, neuro, skin, endocrine and psych systems are negative, except as otherwise noted.    OBJECTIVE:   There were no vitals taken for this visit. Estimated body mass index is 36.28 kg/m  as calculated from the following:    Height as of 10/25/18: 1.549 m (5' 1\").    Weight as of 10/22/19: 87.1 kg (192 lb).  EXAM:   GENERAL: healthy, alert and no distress  EYES: Eyes grossly normal to inspection, PERRL and conjunctivae and sclerae normal  HENT: ear canals and TM's normal, nose and mouth without ulcers or lesions  NECK: no adenopathy, no asymmetry, masses, or scars and thyroid normal to palpation  RESP: lungs clear to auscultation - no rales, rhonchi or wheezes  CV: regular rate and rhythm, normal S1 S2, no S3 or S4, no murmur, click or rub, no peripheral edema and peripheral pulses strong  ABDOMEN: soft, nontender, no hepatosplenomegaly, no masses and bowel sounds normal  MS: no gross musculoskeletal defects noted, no edema  SKIN: thickened, yellow, severely dystrophic great toe nail  NEURO: Normal strength and tone, mentation intact and speech normal  PSYCH: mentation appears normal, affect normal/bright    Diagnostic Test Results:  Labs reviewed in Epic    ASSESSMENT / PLAN:   1. Encounter for Medicare annual wellness exam  - discussed preventative guidelines, healthy diet, exercise and weight management    2. Hypothyroidism, unspecified type  Refill once lab results known  - TSH ([facility])    3. Sjogren's syndrome, with unspecified organ involvement (H)  Stable    4. Stage 3a chronic kidney disease  Stable, repeat BMP    5. Bronchiectasis without complication (H)  Stable, declines further workup or treatment.  Will call    6. Onychomycosis  Will consider new podiatrist this summer if remains bothersome      Patient has been advised of split billing requirements and indicates understanding: Yes    COUNSELING:  Reviewed " "preventive health counseling, as reflected in patient instructions       Regular exercise       Healthy diet/nutrition    Estimated body mass index is 36.28 kg/m  as calculated from the following:    Height as of 10/25/18: 1.549 m (5' 1\").    Weight as of 10/22/19: 87.1 kg (192 lb).    Weight management plan: Discussed healthy diet and exercise guidelines    She reports that she quit smoking about 55 years ago. She has never used smokeless tobacco.    Appropriate preventive services were discussed with this patient, including applicable screening as appropriate for cardiovascular disease, diabetes, osteopenia/osteoporosis, and glaucoma.  As appropriate for age/gender, discussed screening for colorectal cancer, prostate cancer, breast cancer, and cervical cancer. Checklist reviewing preventive services available has been given to the patient.    Reviewed patients plan of care and provided an AVS. The Basic Care Plan (routine screening as documented in Health Maintenance) for June meets the Care Plan requirement. This Care Plan has been established and reviewed with the Patient.    Counseling Resources:  ATP IV Guidelines  Pooled Cohorts Equation Calculator  Breast Cancer Risk Calculator  BRCA-Related Cancer Risk Assessment: FHS-7 Tool  FRAX Risk Assessment  ICSI Preventive Guidelines  Dietary Guidelines for Americans, 2010  USDA's MyPlate  ASA Prophylaxis  Lung CA Screening    Loc Ferrer MD  [facility]  "

## 2020-10-28 NOTE — LETTER
[facility] [address] [place]  [address]  Phone: [phone]    October 30, 2020      June Burroughs [facility] [address]  [facility] [address]              Dear June,    Your recent lab results are normal and I do not recommend any changes.  If you have any questions, please contact our office.         Sincerely,     Loc Ferrer MD     Results for orders placed or performed in visit on 10/28/20   TSH ([facility])     Status: None   Result Value Ref Range    TSH 2.730 0.450 - 4.500 uIU/mL    Narrative    Performed at:  01 - [facility] [address], [place], [place]  [id number]  : Isaak Noel MD, Phone:  [phone]

## 2020-10-28 NOTE — PATIENT INSTRUCTIONS
Patient Education   Personalized Prevention Plan  You are due for the preventive services outlined below.  Your care team is available to assist you in scheduling these services.  If you have already completed any of these items, please share that information with your care team to update in your medical record.  Health Maintenance Due   Topic Date Due     Zoster (Shingles) Vaccine (2 of 3) 12/24/2013     Discuss Advance Care Planning  04/26/2017     Asthma Control Test  04/25/2019     Asthma Action Plan - yearly  10/25/2019     PHQ-2  01/01/2020     FALL RISK ASSESSMENT  10/22/2020     Thyroid Function Lab  10/22/2020

## 2020-10-28 NOTE — LETTER
My Asthma Action Plan    Name: June Huddleston   YOB: 1928  Date: 10/28/2020   My doctor: Loc Ferrer MD   My clinic: [facility]        My Rescue Medicine:   Albuterol inhaler (Proair/Ventolin/Proventil HFA)  2-4 puffs EVERY 4 HOURS as needed. Use a spacer if recommended by your provider.   My Asthma Severity:   Intermittent / Exercise Induced  Know your asthma triggers: Patient is unaware of triggers             GREEN ZONE   Good Control    I feel good    No cough or wheeze    Can work, sleep and play without asthma symptoms       Take your asthma control medicine every day.     1. If exercise triggers your asthma, take your rescue medication    15 minutes before exercise or sports, and    During exercise if you have asthma symptoms  2. Spacer to use with inhaler: If you have a spacer, make sure to use it with your inhaler             YELLOW ZONE Getting Worse  I have ANY of these:    I do not feel good    Cough or wheeze    Chest feels tight    Wake up at night   1. Keep taking your Green Zone medications  2. Start taking your rescue medicine:    every 20 minutes for up to 1 hour. Then every 4 hours for 24-48 hours.  3. If you stay in the Yellow Zone for more than 12-24 hours, contact your doctor.  4. If you do not return to the Green Zone in 12-24 hours or you get worse, start taking your oral steroid medicine if prescribed by your provider.           RED ZONE Medical Alert - Get Help  I have ANY of these:    I feel awful    Medicine is not helping    Breathing getting harder    Trouble walking or talking    Nose opens wide to breathe       1. Take your rescue medicine NOW  2. If your provider has prescribed an oral steroid medicine, start taking it NOW  3. Call your doctor NOW  4. If you are still in the Red Zone after 20 minutes and you have not reached your doctor:    Take your rescue medicine again and    Call 911 or go to the emergency room right away    See your regular  doctor within 2 weeks of an Emergency Room or Urgent Care visit for follow-up treatment.          Annual Reminders:  Meet with Asthma Educator,  Flu Shot in the Fall, consider Pneumonia Vaccination for patients with asthma (aged 19 and older).    Pharmacy:    [id number] - [place] - [address] [id number] - [place], [place] - [address]    Electronically signed by Loc Ferrer MD   Date: 10/28/20                    Asthma Triggers  How To Control Things That Make Your Asthma Worse    Triggers are things that make your asthma worse.  Look at the list below to help you find your triggers and   what you can do about them. You can help prevent asthma flare-ups by staying away from your triggers.      Trigger                                                          What you can do   Cigarette Smoke  Tobacco smoke can make asthma worse. Do not allow smoking in your home, car or around you.  Be sure no one smokes at a child s day care or school.  If you smoke, ask your health care provider for ways to help you quit.  Ask family members to quit too.  Ask your health care provider for a referral to Quit Plan to help you quit smoking, or call 4-224-940-PLAN.     Colds, Flu, Bronchitis  These are common triggers of asthma. Wash your hands often.  Don t touch your eyes, nose or mouth.  Get a flu shot every year.     Dust Mites  These are tiny bugs that live in cloth or carpet. They are too small to see. Wash sheets and blankets in hot water every week.   Encase pillows and mattress in dust mite proof covers.  Avoid having carpet if you can. If you have carpet, vacuum weekly.   Use a dust mask and HEPA vacuum.   Pollen and Outdoor Mold  Some people are allergic to trees, grass, or weed pollen, or molds. Try to keep your windows closed.  Limit time out doors when pollen count is high.   Ask you health care provider about  taking medicine during allergy season.     Animal Dander  Some people are allergic to skin flakes, urine or saliva from pets with fur or feathers. Keep pets with fur or feathers out of your home.    If you can t keep the pet outdoors, then keep the pet out of your bedroom.  Keep the bedroom door closed.  Keep pets off cloth furniture and away from stuffed toys.     Mice, Rats, and Cockroaches  Some people are allergic to the waste from these pests.   Cover food and garbage.  Clean up spills and food crumbs.  Store grease in the refrigerator.   Keep food out of the bedroom.   Indoor Mold  This can be a trigger if your home has high moisture. Fix leaking faucets, pipes, or other sources of water.   Clean moldy surfaces.  Dehumidify basement if it is damp and smelly.   Smoke, Strong Odors, and Sprays  These can reduce air quality. Stay away from strong odors and sprays, such as perfume, powder, hair spray, paints, smoke incense, paint, cleaning products, candles and new carpet.   Exercise or Sports  Some people with asthma have this trigger. Be active!  Ask your doctor about taking medicine before sports or exercise to prevent symptoms.    Warm up for 5-10 minutes before and after sports or exercise.     Other Triggers of Asthma  Cold air:  Cover your nose and mouth with a scarf.  Sometimes laughing or crying can be a trigger.  Some medicines and food can trigger asthma.

## 2020-10-29 LAB — TSH BLD-ACNC: 2.73 UIU/ML (ref 0.45–4.5)

## 2020-10-30 DIAGNOSIS — E03.9 HYPOTHYROIDISM, UNSPECIFIED TYPE: ICD-10-CM

## 2020-10-30 RX ORDER — LEVOTHYROXINE SODIUM 75 UG/1
75 TABLET ORAL DAILY
Qty: 90 TABLET | Refills: 3 | Status: SHIPPED | OUTPATIENT
Start: 2020-10-30 | End: 2021-10-26

## 2021-03-04 DIAGNOSIS — Z23 HIGH PRIORITY FOR COVID-19 VIRUS VACCINATION: Primary | ICD-10-CM

## 2021-03-04 PROCEDURE — 0011A PR COVID VAC MODERNA 100 MCG/0.5 ML IM: CPT | Performed by: FAMILY MEDICINE

## 2021-03-04 PROCEDURE — 91301 PR COVID VAC MODERNA 100 MCG/0.5 ML IM: CPT | Performed by: FAMILY MEDICINE

## 2021-04-01 DIAGNOSIS — Z23 HIGH PRIORITY FOR COVID-19 VIRUS VACCINATION: Primary | ICD-10-CM

## 2021-04-01 PROCEDURE — 0012A COVID-19,PF,MODERNA: CPT | Performed by: FAMILY MEDICINE

## 2021-04-01 PROCEDURE — 91301 COVID-19,PF,MODERNA: CPT | Performed by: FAMILY MEDICINE

## 2021-04-12 DIAGNOSIS — M35.00 SJOGREN'S SYNDROME, WITH UNSPECIFIED ORGAN INVOLVEMENT (H): ICD-10-CM

## 2021-04-12 DIAGNOSIS — J84.10 POSTINFLAMMATORY PULMONARY FIBROSIS (H): ICD-10-CM

## 2021-04-12 RX ORDER — CELECOXIB 200 MG/1
CAPSULE ORAL
Qty: 90 CAPSULE | Refills: 3 | Status: SHIPPED | OUTPATIENT
Start: 2021-04-12 | End: 2022-04-06

## 2021-10-24 DIAGNOSIS — E03.9 HYPOTHYROIDISM, UNSPECIFIED TYPE: ICD-10-CM

## 2021-10-26 RX ORDER — LEVOTHYROXINE SODIUM 75 UG/1
TABLET ORAL
Qty: 90 TABLET | Refills: 3 | Status: SHIPPED | OUTPATIENT
Start: 2021-10-26 | End: 2022-10-19

## 2021-10-28 NOTE — PROGRESS NOTES
"  SUBJECTIVE:   June Huddleston is a 89 year old female who presents for Preventive Visit.      Patient has been advised of split billing requirements and indicates understanding: Yes  Are you in the first 12 months of your Medicare Part B coverage?  No    Hypothyroidism: stable, feels well on medication     Sjogrens: stable     Bronchiectasis: pulm notes reviewed 2015.  Her breathing is at baseline but does get SOB with exertion at times.  Declines further workup or follow up     CKD3: asymptomatic.    Physical Health:    In general, how would you rate your overall physical health? poor    Outside of work, how many days during the week do you exercise? none    Outside of work, approximately how many minutes a day do you exercise?not applicable    If you drink alcohol do you typically have >3 drinks per day or >7 drinks per week? No    Do you usually eat at least 4 servings of fruit and vegetables a day, include whole grains & fiber and avoid regularly eating high fat or \"junk\" foods? NO    Do you have any problems taking medications regularly?  No    Do you have any side effects from medications? none    Needs assistance for the following daily activities: no assistance needed    Which of the following safety concerns are present in your home?  none identified     Hearing impairment: Yes, has hearing aids    In the past 6 months, have you been bothered by leaking of urine? yes    Mental Health:    In general, how would you rate your overall mental or emotional health? good  PHQ-2 Score:      Do you feel safe in your environment? Yes    Have you ever done Advance Care Planning? (For example, a Health Directive, POLST, or a discussion with a medical provider or your loved ones about your wishes): No, advance care planning information given to patient to review.  Patient plans to discuss their wishes with loved ones or provider.      Additional concerns to address?      Fall risk:  Fallen 2 or more times in the past " year?: No  Any fall with injury in the past year?: No    Cognitive Screenin) Repeat 3 items (Leader, Season, Table)    2) Clock draw: NORMAL  3) 3 item recall: Recalls NO objects   Results: NORMAL clock, 1-2 items recalled: COGNITIVE IMPAIRMENT LESS LIKELY    Mini-CogTM Copyright MYRIAM Ward. Licensed by the author for use in [facility]; reprinted with permission (ruy@[facility]). All rights reserved.      Do you have sleep apnea, excessive snoring or daytime drowsiness?: yes - drowsiness        -------------------------------------    Reviewed and updated as needed this visit by clinical staff  Tobacco  Allergies  Meds  Problems  Med Hx  Surg Hx  Fam Hx          Reviewed and updated as needed this visit by Provider  Tobacco  Allergies  Meds  Problems  Med Hx  Surg Hx  Fam Hx         Social History     Tobacco Use     Smoking status: Former Smoker     Quit date: 10/4/1965     Years since quittin.1     Smokeless tobacco: Never Used   Substance Use Topics     Alcohol use: Yes     Alcohol/week: 0.0 standard drinks     Comment: special occasions                           Current providers sharing in care for this patient include:   Patient Care Team:  Loc Ferrer MD as PCP - General (Family Medicine)  Arleth K[facility] as Pharmacist (Pharmacist)  Loc Ferrer MD as Assigned PCP    The following health maintenance items are reviewed in Epic and correct as of today:  Health Maintenance   Topic Date Due     ZOSTER IMMUNIZATION (2 of 3) 2013     FALL RISK ASSESSMENT  10/28/2021     TSH W/FREE T4 REFLEX  10/28/2021     MEDICARE ANNUAL WELLNESS VISIT  10/29/2022     DTAP/TDAP/TD IMMUNIZATION (4 - Td or Tdap) 10/31/2022     ADVANCE CARE PLANNING  10/28/2025     PHQ-2  Completed     INFLUENZA VACCINE  Completed     Pneumococcal Vaccine: 65+ Years  Completed     COVID-19 Vaccine  Completed     IPV IMMUNIZATION  Aged Out     MENINGITIS IMMUNIZATION  Aged Out      "HEPATITIS B IMMUNIZATION  Aged Out     Lab work is in process  Last 3 Pap and HPV Results:      ROS:  Constitutional, HEENT, cardiovascular, pulmonary, GI, , musculoskeletal, neuro, skin, endocrine and psych systems are negative, except as otherwise noted.    OBJECTIVE:   /80   Pulse 94   Wt 87.1 kg (192 lb)   SpO2 95%   BMI 34.84 kg/m   Estimated body mass index is 34.84 kg/m  as calculated from the following:    Height as of 10/28/20: 1.581 m (5' 2.25\").    Weight as of this encounter: 87.1 kg (192 lb).  EXAM:   GENERAL APPEARANCE: healthy, alert and no distress  EYES: Eyes grossly normal to inspection, PERRL and conjunctivae and sclerae normal  HENT: ear canals and TM's normal, nose and mouth without ulcers or lesions, oropharynx clear and oral mucous membranes moist  NECK: no adenopathy, no asymmetry, masses, or scars and thyroid normal to palpation  RESP: lungs clear to auscultation - no rales, rhonchi or wheezes  CV: regular rates and rhythm, normal S1 S2, no S3 or S4, no peripheral edema, peripheral pulses strong and grade 2/6 murmur heard best over the RUSB  ABDOMEN: soft, nontender, no hepatosplenomegaly, no masses and bowel sounds normal  MS: no musculoskeletal defects are noted and gait is age appropriate without ataxia  SKIN: no suspicious lesions or rashes  NEURO: Normal strength and tone, sensory exam grossly normal, mentation intact and speech normal  PSYCH: mentation appears normal and affect normal/bright    Diagnostic Test Results:  Labs reviewed in Epic    ASSESSMENT / PLAN:   Encounter for Medicare annual wellness exam  - discussed preventative guidelines, healthy diet, exercise and weight management    Hypothyroidism, unspecified type  recheck  - TSH ([facility])    Sjogren's syndrome, with unspecified organ involvement (H)  Stable    Bronchiectasis without complication (H)  Mostly stable.  Trial of sample Trelegy (only one available).  Will call if helpful and will send ICS-LABA or " "RIGOBERTO    Stage 3a chronic kidney disease (H)  Stable, cont to monitor.  Blood pressure control, blood sugar control, vitamin D supplementation and avoidance of NSAIDs discussed.  - Basic Metabolic Panel (8) ([facility])    Primary osteoarthritis involving multiple joints  Cont celebrex    Need for influenza vaccination  - CT FLU VACCINE, INCREASED ANTIGEN, PRESV FREE  - ADMIN INFLUENZA VIRUS VAC      Patient has been advised of split billing requirements and indicates understanding: Yes    COUNSELING:  Reviewed preventive health counseling, as reflected in patient instructions    Estimated body mass index is 34.84 kg/m  as calculated from the following:    Height as of 10/28/20: 1.581 m (5' 2.25\").    Weight as of this encounter: 87.1 kg (192 lb).        She reports that she quit smoking about 56 years ago. She has never used smokeless tobacco.    Appropriate preventive services were discussed with this patient, including applicable screening as appropriate for cardiovascular disease, diabetes, osteopenia/osteoporosis, and glaucoma.  As appropriate for age/gender, discussed screening for colorectal cancer, prostate cancer, breast cancer, and cervical cancer. Checklist reviewing preventive services available has been given to the patient.    Reviewed patients plan of care and provided an AVS. The Basic Care Plan (routine screening as documented in Health Maintenance) for June meets the Care Plan requirement. This Care Plan has been established and reviewed with the Patient.    Counseling Resources:  ATP IV Guidelines  Pooled Cohorts Equation Calculator  Breast Cancer Risk Calculator  BRCA-Related Cancer Risk Assessment: FHS-7 Tool  FRAX Risk Assessment  ICSI Preventive Guidelines  Dietary Guidelines for Americans, 2010  USDA's MyPlate  ASA Prophylaxis  Lung CA Screening    Loc Ferrer MD  [facility]  "

## 2021-10-29 ENCOUNTER — OFFICE VISIT (OUTPATIENT)
Dept: FAMILY MEDICINE | Facility: CLINIC | Age: 86
End: 2021-10-29

## 2021-10-29 VITALS
WEIGHT: 192 LBS | BODY MASS INDEX: 34.84 KG/M2 | DIASTOLIC BLOOD PRESSURE: 80 MMHG | HEART RATE: 94 BPM | SYSTOLIC BLOOD PRESSURE: 124 MMHG | OXYGEN SATURATION: 95 %

## 2021-10-29 DIAGNOSIS — E03.9 HYPOTHYROIDISM, UNSPECIFIED TYPE: ICD-10-CM

## 2021-10-29 DIAGNOSIS — J47.9 BRONCHIECTASIS WITHOUT COMPLICATION (H): ICD-10-CM

## 2021-10-29 DIAGNOSIS — M35.00 SJOGREN'S SYNDROME, WITH UNSPECIFIED ORGAN INVOLVEMENT (H): ICD-10-CM

## 2021-10-29 DIAGNOSIS — Z00.00 ENCOUNTER FOR MEDICARE ANNUAL WELLNESS EXAM: Primary | ICD-10-CM

## 2021-10-29 DIAGNOSIS — M15.0 PRIMARY OSTEOARTHRITIS INVOLVING MULTIPLE JOINTS: ICD-10-CM

## 2021-10-29 DIAGNOSIS — N18.31 STAGE 3A CHRONIC KIDNEY DISEASE (H): ICD-10-CM

## 2021-10-29 DIAGNOSIS — Z23 NEED FOR INFLUENZA VACCINATION: ICD-10-CM

## 2021-10-29 PROCEDURE — G0008 ADMIN INFLUENZA VIRUS VAC: HCPCS | Mod: 59 | Performed by: FAMILY MEDICINE

## 2021-10-29 PROCEDURE — 90662 IIV NO PRSV INCREASED AG IM: CPT | Performed by: FAMILY MEDICINE

## 2021-10-29 PROCEDURE — 99214 OFFICE O/P EST MOD 30 MIN: CPT | Mod: 25 | Performed by: FAMILY MEDICINE

## 2021-10-29 PROCEDURE — 36415 COLL VENOUS BLD VENIPUNCTURE: CPT | Performed by: FAMILY MEDICINE

## 2021-10-29 PROCEDURE — G0439 PPPS, SUBSEQ VISIT: HCPCS | Performed by: FAMILY MEDICINE

## 2021-10-29 NOTE — LETTER
[facility] [address] [place]  [address]  Phone: [phone]    November 1, 2021      June Huddleston  [address]  [facility] [address]          Dear June,    Your recent lab results are within the expected range.  Your kidney function remains mildly reduced.  Everything else is unchanged and looks good.     It was very nice seeing you.  If you have any questions, please contact our office.       Sincerely,     Loc Ferrer MD /[place]  Results for orders placed or performed in visit on 10/29/21   TSH ([facility])     Status: None   Result Value Ref Range    TSH 2.620 0.450 - 4.500 uIU/mL    Narrative    Performed at:  01 - [facility] [address], [place], [place]  [id number]  : Isaak Noel MD, Phone:  [phone]   Basic Metabolic Panel (8) ([facility])     Status: Abnormal   Result Value Ref Range    Glucose 107 (H) 65 - 99 mg/dL    Urea Nitrogen 27 10 - 36 mg/dL    Creatinine 1.09 (H) 0.57 - 1.00 mg/dL    eGFR If NonAfricn Am 44 (L) >59 mL/min/1.73    eGFR If Africn Am 51 (L) >59 mL/min/1.73    BUN/Creatinine Ratio 25 12 - 28    Sodium 141 134 - 144 mmol/L    Potassium 4.4 3.5 - 5.2 mmol/L    Chloride 104 96 - 106 mmol/L    Total CO2 25 20 - 29 mmol/L    Calcium 9.4 8.7 - 10.3 mg/dL    Narrative    Performed at:  01 - [facility] [address], [place], [place]  [id number]  : Isaak Noel MD, Phone:  [phone]

## 2021-10-30 LAB
BUN SERPL-MCNC: 27 MG/DL (ref 10–36)
BUN/CREATININE RATIO: 25 (ref 12–28)
CALCIUM SERPL-MCNC: 9.4 MG/DL (ref 8.7–10.3)
CHLORIDE SERPLBLD-SCNC: 104 MMOL/L (ref 96–106)
CREAT SERPL-MCNC: 1.09 MG/DL (ref 0.57–1)
EGFR IF AFRICN AM: 51 ML/MIN/1.73
EGFR IF NONAFRICN AM: 44 ML/MIN/1.73
GLUCOSE SERPL-MCNC: 107 MG/DL (ref 65–99)
POTASSIUM SERPL-SCNC: 4.4 MMOL/L (ref 3.5–5.2)
SODIUM SERPL-SCNC: 141 MMOL/L (ref 134–144)
TOTAL CO2: 25 MMOL/L (ref 20–29)
TSH BLD-ACNC: 2.62 UIU/ML (ref 0.45–4.5)

## 2022-04-06 DIAGNOSIS — J84.10 POSTINFLAMMATORY PULMONARY FIBROSIS (H): ICD-10-CM

## 2022-04-06 DIAGNOSIS — M35.00 SJOGREN'S SYNDROME, WITH UNSPECIFIED ORGAN INVOLVEMENT (H): ICD-10-CM

## 2022-04-06 RX ORDER — CELECOXIB 200 MG/1
CAPSULE ORAL
Qty: 90 CAPSULE | Refills: 3 | Status: SHIPPED | OUTPATIENT
Start: 2022-04-06 | End: 2023-03-31

## 2022-04-06 NOTE — TELEPHONE ENCOUNTER
Celecoxib 200 mg  LOV 10/29/21  Sjogren's syndrome, with unspecified organ involvement (H)  Stable  Last fill 4/12/21  Return in about 53 weeks (around 11/4/2022) for Annual Wellness Visit

## 2022-06-22 ENCOUNTER — APPOINTMENT (OUTPATIENT)
Dept: GENERAL RADIOLOGY | Facility: CLINIC | Age: 87
End: 2022-06-22
Attending: EMERGENCY MEDICINE
Payer: MEDICARE

## 2022-06-22 ENCOUNTER — HOSPITAL ENCOUNTER (EMERGENCY)
Facility: CLINIC | Age: 87
Discharge: HOME | End: 2022-06-22
Attending: EMERGENCY MEDICINE | Admitting: EMERGENCY MEDICINE
Payer: MEDICARE

## 2022-06-22 VITALS
DIASTOLIC BLOOD PRESSURE: 83 MMHG | SYSTOLIC BLOOD PRESSURE: 142 MMHG | TEMPERATURE: 97.7 F | OXYGEN SATURATION: 94 % | HEART RATE: 87 BPM | BODY MASS INDEX: 35.34 KG/M2 | RESPIRATION RATE: 25 BRPM | WEIGHT: 180 LBS | HEIGHT: 60 IN

## 2022-06-22 DIAGNOSIS — E86.0 DEHYDRATION: ICD-10-CM

## 2022-06-22 DIAGNOSIS — R55 NEAR SYNCOPE: ICD-10-CM

## 2022-06-22 LAB
ALBUMIN UR-MCNC: 10 MG/DL
ANION GAP SERPL CALCULATED.3IONS-SCNC: 4 MMOL/L (ref 3–14)
APPEARANCE UR: CLEAR
ATRIAL RATE - MUSE: 86 BPM
BASOPHILS # BLD AUTO: 0.1 10E3/UL (ref 0–0.2)
BASOPHILS NFR BLD AUTO: 1 %
BILIRUB UR QL STRIP: NEGATIVE
BUN SERPL-MCNC: 18 MG/DL (ref 7–30)
CALCIUM SERPL-MCNC: 10.6 MG/DL (ref 8.5–10.1)
CHLORIDE BLD-SCNC: 104 MMOL/L (ref 94–109)
CO2 SERPL-SCNC: 30 MMOL/L (ref 20–32)
COLOR UR AUTO: YELLOW
CREAT SERPL-MCNC: 1.13 MG/DL (ref 0.52–1.04)
DIASTOLIC BLOOD PRESSURE - MUSE: NORMAL MMHG
EOSINOPHIL # BLD AUTO: 0.1 10E3/UL (ref 0–0.7)
EOSINOPHIL NFR BLD AUTO: 1 %
ERYTHROCYTE [DISTWIDTH] IN BLOOD BY AUTOMATED COUNT: 13.3 % (ref 10–15)
GFR SERPL CREATININE-BSD FRML MDRD: 45 ML/MIN/1.73M2
GLUCOSE BLD-MCNC: 102 MG/DL (ref 70–99)
GLUCOSE UR STRIP-MCNC: NEGATIVE MG/DL
HCT VFR BLD AUTO: 49.9 % (ref 35–47)
HGB BLD-MCNC: 16 G/DL (ref 11.7–15.7)
HGB UR QL STRIP: NEGATIVE
HYALINE CASTS: 4 /LPF
IMM GRANULOCYTES # BLD: 0 10E3/UL
IMM GRANULOCYTES NFR BLD: 0 %
INTERPRETATION ECG - MUSE: NORMAL
KETONES UR STRIP-MCNC: NEGATIVE MG/DL
LEUKOCYTE ESTERASE UR QL STRIP: ABNORMAL
LYMPHOCYTES # BLD AUTO: 1.1 10E3/UL (ref 0.8–5.3)
LYMPHOCYTES NFR BLD AUTO: 10 %
MCH RBC QN AUTO: 29.7 PG (ref 26.5–33)
MCHC RBC AUTO-ENTMCNC: 32.1 G/DL (ref 31.5–36.5)
MCV RBC AUTO: 93 FL (ref 78–100)
MONOCYTES # BLD AUTO: 1.2 10E3/UL (ref 0–1.3)
MONOCYTES NFR BLD AUTO: 11 %
MUCOUS THREADS #/AREA URNS LPF: PRESENT /LPF
NEUTROPHILS # BLD AUTO: 8.6 10E3/UL (ref 1.6–8.3)
NEUTROPHILS NFR BLD AUTO: 77 %
NITRATE UR QL: NEGATIVE
NRBC # BLD AUTO: 0 10E3/UL
NRBC BLD AUTO-RTO: 0 /100
P AXIS - MUSE: 46 DEGREES
PH UR STRIP: 6.5 [PH] (ref 5–7)
PLATELET # BLD AUTO: 273 10E3/UL (ref 150–450)
POTASSIUM BLD-SCNC: 4.1 MMOL/L (ref 3.4–5.3)
PR INTERVAL - MUSE: 180 MS
QRS DURATION - MUSE: 90 MS
QT - MUSE: 384 MS
QTC - MUSE: 459 MS
R AXIS - MUSE: -15 DEGREES
RBC # BLD AUTO: 5.38 10E6/UL (ref 3.8–5.2)
RBC URINE: 1 /HPF
SODIUM SERPL-SCNC: 138 MMOL/L (ref 133–144)
SP GR UR STRIP: 1.01 (ref 1–1.03)
SQUAMOUS EPITHELIAL: 3 /HPF
SYSTOLIC BLOOD PRESSURE - MUSE: NORMAL MMHG
T AXIS - MUSE: 27 DEGREES
TROPONIN I SERPL HS-MCNC: 12 NG/L
UROBILINOGEN UR STRIP-MCNC: NORMAL MG/DL
VENTRICULAR RATE- MUSE: 86 BPM
WBC # BLD AUTO: 11.2 10E3/UL (ref 4–11)
WBC URINE: 5 /HPF

## 2022-06-22 PROCEDURE — 84484 ASSAY OF TROPONIN QUANT: CPT | Performed by: EMERGENCY MEDICINE

## 2022-06-22 PROCEDURE — 258N000003 HC RX IP 258 OP 636: Performed by: EMERGENCY MEDICINE

## 2022-06-22 PROCEDURE — 36415 COLL VENOUS BLD VENIPUNCTURE: CPT | Performed by: EMERGENCY MEDICINE

## 2022-06-22 PROCEDURE — 82310 ASSAY OF CALCIUM: CPT | Performed by: EMERGENCY MEDICINE

## 2022-06-22 PROCEDURE — 96360 HYDRATION IV INFUSION INIT: CPT

## 2022-06-22 PROCEDURE — 85025 COMPLETE CBC W/AUTO DIFF WBC: CPT | Performed by: EMERGENCY MEDICINE

## 2022-06-22 PROCEDURE — 93005 ELECTROCARDIOGRAM TRACING: CPT

## 2022-06-22 PROCEDURE — 99285 EMERGENCY DEPT VISIT HI MDM: CPT | Mod: 25

## 2022-06-22 PROCEDURE — 71046 X-RAY EXAM CHEST 2 VIEWS: CPT

## 2022-06-22 PROCEDURE — 81001 URINALYSIS AUTO W/SCOPE: CPT | Performed by: EMERGENCY MEDICINE

## 2022-06-22 RX ORDER — SODIUM CHLORIDE 9 MG/ML
1000 INJECTION, SOLUTION INTRAVENOUS CONTINUOUS
Status: DISCONTINUED | OUTPATIENT
Start: 2022-06-22 | End: 2022-06-22 | Stop reason: HOSPADM

## 2022-06-22 RX ADMIN — SODIUM CHLORIDE 1000 ML: 9 INJECTION, SOLUTION INTRAVENOUS at 11:10

## 2022-06-22 RX ADMIN — SODIUM CHLORIDE 500 ML: 9 INJECTION, SOLUTION INTRAVENOUS at 10:03

## 2022-06-22 ASSESSMENT — ENCOUNTER SYMPTOMS
WEAKNESS: 1
DIZZINESS: 1

## 2022-06-22 NOTE — ED TRIAGE NOTES
Pt presents to ED via EMS for evaluation of near syncopal episode. Pt was checking out at [employer] when pt lowered themselves to the ground for feeling like they might faint. EMS reports no LOC. Pt has a hx of COPD and Sjognens Syndrome. Pt lives independently in a condo and drives herself. Pt reports light headedness and shaking upon arrival to ED. Pre hospital blood sugar: 139. Per EMS 12 lead en route to ED looked good.      Triage Assessment     Row Name 06/22/22 0846       Respiratory WDL    Rhythm/Pattern, Respiratory depth regular;pattern regular;unlabored    Mucous Membranes pink;intact;moist    Cough Frequency no cough       Skin Circulation/Temperature WDL    Skin Temperature neutral       Cardiac WDL    Cardiac Rhythm radial pulse regular       Cognitive/Neuro/Behavioral WDL    Level of Consciousness alert    Orientation oriented x 4    Speech clear;spontaneous;logical    Mood/Behavior calm;cooperative       Pupils (CN II)    Pupil PERRLA yes

## 2022-06-22 NOTE — ED NOTES
Bed: ED10  Expected date:   Expected time:   Means of arrival:   Comments:  Meenu Guevara F near syncope eta 0870

## 2022-06-22 NOTE — ED NOTES
ED MD contacted [employer], groceries should be waiting for patient. Pt reports okay with cab back to [employer]. Cab called for pt, pt ambulates well independently with cane.

## 2022-06-22 NOTE — ED PROVIDER NOTES
History   Chief Complaint:  Syncope       The history is provided by the patient.      June Huddleston is a 89 year old female with history of CKD who presents after feeling dizzy and concerned for having a syncopal episode in the checkout line at [employer]. She lowered herself down to the ground to prevent herself from falling and getting injured. She states that she has been getting weaker as she ages, but is unaware of why she almost fainted. She denies chest pain.    Review of Systems   Cardiovascular: Negative for chest pain.   Neurological: Positive for dizziness and weakness.   All other systems reviewed and are negative.    Allergies:  Astelin   Belladonna Alkaloids  Oxybutynin Chloride  Penicillins  Shellfish-Derived Products  Sudafed  Triamcinolone Acetonide  Lanolin    Medications:  Albuterol  Synthroid  Celebrex    Past Medical History:     Hypothyroidism  CKD  Bronchiectasis  Calculus of gallbladder  Diaphragmatic hernia     Past Surgical History:    Bronchoscopy  Urethral dilatation  Cataract IOL     Family History:    None    Social History:  The patient presents to the ED alone.  Living Situation: Lives in a condo independently.    Physical Exam     Patient Vitals for the past 24 hrs:   BP Temp Temp src Pulse Resp SpO2 Height Weight   06/22/22 1115 -- -- -- 87 25 94 % -- --   06/22/22 1000 -- -- -- 80 10 94 % -- --   06/22/22 0930 (!) 142/83 -- -- 86 24 94 % -- --   06/22/22 0900 (!) 163/85 -- -- 92 11 95 % -- --   06/22/22 0844 (!) 150/66 97.7  F (36.5  C) Oral 92 16 95 % 1.524 m (5') 81.6 kg (180 lb)       Physical Exam  General: Alert, No distress. Nontoxic appearance  Head: No signs of trauma.   Mouth/Throat: Oropharynx moist.   Eyes: Conjunctivae are normal. Pupils are equal..   Neck: Normal range of motion.    CV: Appears well perfused.  She has a systolic ejection murmur.  Resp:No respiratory distress.   MSK: Normal range of motion. No obvious deformity.   Neuro: The patient is alert and  interactive. TRIANA. Speech normal. GCS 15  Skin: No lesion or sign of trauma noted.   Psych: normal mood and affect. behavior is normal.     Emergency Department Course   ECG  ECG taken at 0857, ECG read at 0857  Sinus rhythm with occasional premature ventricular complexes and premature atrial complexes  Possible left atrial enlargement  Borderline ECG   Rate 86 bpm. MS interval 180 ms. QRS duration 90 ms. QT/QTc 384/459 ms. P-R-T axes 46 -15 27.     Imaging:  XR Chest 2 Views   Preliminary Result   IMPRESSION: Mild interstitial prominence throughout both lungs is not   significantly changed, and is most likely fibrotic. The lungs are   otherwise clear. No pleural effusions. Heart size is stable. Pulmonary   vascularity is within normal limits. Thoracic kyphosis.        Report per radiology    Laboratory:  Labs Ordered and Resulted from Time of ED Arrival to Time of ED Departure   BASIC METABOLIC PANEL - Abnormal       Result Value    Sodium 138      Potassium 4.1      Chloride 104      Carbon Dioxide (CO2) 30      Anion Gap 4      Urea Nitrogen 18      Creatinine 1.13 (*)     Calcium 10.6 (*)     Glucose 102 (*)     GFR Estimate 45 (*)    ROUTINE UA WITH MICROSCOPIC REFLEX TO CULTURE - Abnormal    Color Urine Yellow      Appearance Urine Clear      Glucose Urine Negative      Bilirubin Urine Negative      Ketones Urine Negative      Specific Gravity Urine 1.015      Blood Urine Negative      pH Urine 6.5      Protein Albumin Urine 10  (*)     Urobilinogen Urine Normal      Nitrite Urine Negative      Leukocyte Esterase Urine Small (*)     Mucus Urine Present (*)     RBC Urine 1      WBC Urine 5      Squamous Epithelials Urine 3 (*)     Hyaline Casts Urine 4 (*)    CBC WITH PLATELETS AND DIFFERENTIAL - Abnormal    WBC Count 11.2 (*)     RBC Count 5.38 (*)     Hemoglobin 16.0 (*)     Hematocrit 49.9 (*)     MCV 93      MCH 29.7      MCHC 32.1      RDW 13.3      Platelet Count 273      % Neutrophils 77      %  Lymphocytes 10      % Monocytes 11      % Eosinophils 1      % Basophils 1      % Immature Granulocytes 0      NRBCs per 100 WBC 0      Absolute Neutrophils 8.6 (*)     Absolute Lymphocytes 1.1      Absolute Monocytes 1.2      Absolute Eosinophils 0.1      Absolute Basophils 0.1      Absolute Immature Granulocytes 0.0      Absolute NRBCs 0.0     TROPONIN I - Normal    Troponin I High Sensitivity 12          Emergency Department Course:    Reviewed:  I reviewed nursing notes, vitals, past medical history and Care Everywhere/    Assessments:  0855 I obtained history and examined the patient as noted above.   1015 I rechecked the patient and explained findings.   1122 I rechecked the patient. She is feeling improved. She is able to ambulate independently with a cane.    Interventions:  1003 NS 1 L IV    Disposition:  The patient was discharged to home.     Impression & Plan     Medical Decision Making:  June Huddleston is a 89 year old female who presents for evaluation of near-syncope.  They definitely did not have actual syncope.  The differential for near-syncope is broad and includes etiologies such as cardiac arrythmia, ACS, aortic stenosis, HOCM, PE, orthostatic hypotension, drugs, situational, carotid hypersensitivity, seizure, TIA, stroke, vasovagal. There are no signs of a concerning etiology for near- syncope at this point.  Although she does have aortic stenosis.  Currently she is asymptomatic.  In addition, there is no family history of sudden death, no chest pain, no seizure activity or post-ictal period, no murmur, and no signs of orthostasis in the ED, no focal neurologic symptoms, and no complaints of concerning headache.  The workup in the ED is negative and the physical exam is re-assurring.  She does appear to be slightly dehydrated.  This may have contributed to her initial symptoms.  Supportive outpatient management is therefore indicated.  She was ambulated in the emergency department with her  cane and did well.  She is requesting discharge.    Diagnosis:    ICD-10-CM    1. Near syncope  R55    2. Dehydration  E86.0        Scribe Disclosure:  I, Jeff Mcgovern, am serving as a scribe at 8:53 AM on 6/22/2022 to document services personally performed by Guillermo Lea MD based on my observations and the provider's statements to me.          Guillermo Lea MD  06/22/22 6803

## 2022-10-19 DIAGNOSIS — E03.9 HYPOTHYROIDISM, UNSPECIFIED TYPE: ICD-10-CM

## 2022-10-19 RX ORDER — LEVOTHYROXINE SODIUM 75 UG/1
TABLET ORAL
Qty: 90 TABLET | Refills: 0 | Status: SHIPPED | OUTPATIENT
Start: 2022-10-19 | End: 2023-01-30

## 2022-10-20 NOTE — TELEPHONE ENCOUNTER
[facility] and labs or medication review    Davis Jane,   [facility]  [phone]

## 2023-01-30 DIAGNOSIS — E03.9 HYPOTHYROIDISM, UNSPECIFIED TYPE: ICD-10-CM

## 2023-01-30 RX ORDER — LEVOTHYROXINE SODIUM 75 UG/1
TABLET ORAL
Qty: 90 TABLET | Refills: 0 | Status: SHIPPED | OUTPATIENT
Start: 2023-01-30 | End: 2023-04-28

## 2023-01-30 NOTE — TELEPHONE ENCOUNTER
levothyroxine    LOV 10/29/21- due for cpx   Hypothyroidism, unspecified type  Recheck  Component      Latest Ref Rng & Units 10/29/2021   TSH      0.450 - 4.500 uIU/mL 2.620

## 2023-02-07 NOTE — TELEPHONE ENCOUNTER
2/7/23 Left message for the patient to call the office.  Last seen 10/2021, over due    Davis Jane,   [facility]  [phone]

## 2023-03-22 ENCOUNTER — TRANSFERRED RECORDS (OUTPATIENT)
Dept: FAMILY MEDICINE | Facility: CLINIC | Age: 88
End: 2023-03-22

## 2023-03-30 DIAGNOSIS — J84.10 POSTINFLAMMATORY PULMONARY FIBROSIS (H): ICD-10-CM

## 2023-03-30 DIAGNOSIS — M35.00 SJOGREN'S SYNDROME, WITH UNSPECIFIED ORGAN INVOLVEMENT (H): ICD-10-CM

## 2023-03-30 NOTE — TELEPHONE ENCOUNTER
celecoxib   LOV 10/29/21- due for medicare CPX  Sjogren's syndrome, with unspecified organ involvement (H)  Stable   Last refill 4/6/22  Last labs 6/22/22

## 2023-03-31 RX ORDER — CELECOXIB 200 MG/1
CAPSULE ORAL
Qty: 90 CAPSULE | Refills: 3 | Status: SHIPPED | OUTPATIENT
Start: 2023-03-31

## 2023-04-04 ENCOUNTER — OFFICE VISIT (OUTPATIENT)
Dept: FAMILY MEDICINE | Facility: CLINIC | Age: 88
End: 2023-04-04

## 2023-04-04 VITALS — OXYGEN SATURATION: 95 % | HEART RATE: 106 BPM | HEIGHT: 60 IN | BODY MASS INDEX: 31.06 KG/M2 | WEIGHT: 158.2 LBS

## 2023-04-04 DIAGNOSIS — J47.9 BRONCHIECTASIS WITHOUT COMPLICATION (H): ICD-10-CM

## 2023-04-04 DIAGNOSIS — Z00.00 ENCOUNTER FOR MEDICARE ANNUAL WELLNESS EXAM: Primary | ICD-10-CM

## 2023-04-04 DIAGNOSIS — Z13.0 ENCOUNTER FOR SCREENING FOR HEMATOLOGIC DISORDER: ICD-10-CM

## 2023-04-04 DIAGNOSIS — M35.00 SJOGREN'S SYNDROME, WITH UNSPECIFIED ORGAN INVOLVEMENT (H): ICD-10-CM

## 2023-04-04 DIAGNOSIS — E03.9 HYPOTHYROIDISM, UNSPECIFIED TYPE: ICD-10-CM

## 2023-04-04 DIAGNOSIS — N18.31 STAGE 3A CHRONIC KIDNEY DISEASE (H): ICD-10-CM

## 2023-04-04 LAB
% GRANULOCYTES: 73 % (ref 42.2–75.2)
HCT VFR BLD AUTO: 45.9 % (ref 35–46)
HEMOGLOBIN: 14.9 G/DL (ref 11.8–15.5)
LYMPHOCYTES NFR BLD AUTO: 21 % (ref 20.5–51.1)
MCH RBC QN AUTO: 29.7 PG (ref 27–31)
MCHC RBC AUTO-ENTMCNC: 32.6 G/DL (ref 33–37)
MCV RBC AUTO: 91.2 FL (ref 80–100)
MONOCYTES NFR BLD AUTO: 6 % (ref 1.7–9.3)
PLATELET # BLD AUTO: 239 K/UL (ref 140–450)
RBC # BLD AUTO: 5.03 X10/CMM (ref 3.7–5.2)
WBC # BLD AUTO: 7.7 X10/CMM (ref 3.8–11)

## 2023-04-04 PROCEDURE — 99214 OFFICE O/P EST MOD 30 MIN: CPT | Mod: 25 | Performed by: FAMILY MEDICINE

## 2023-04-04 PROCEDURE — 85025 COMPLETE CBC W/AUTO DIFF WBC: CPT | Performed by: FAMILY MEDICINE

## 2023-04-04 PROCEDURE — 36415 COLL VENOUS BLD VENIPUNCTURE: CPT | Performed by: FAMILY MEDICINE

## 2023-04-04 PROCEDURE — G0439 PPPS, SUBSEQ VISIT: HCPCS | Performed by: FAMILY MEDICINE

## 2023-04-04 NOTE — PROGRESS NOTES
"SUBJECTIVE:   June Huddleston is a 89 year old female who presents for Preventive Visit.    Patient has been advised of split billing requirements and indicates understanding: Yes  Are you in the first 12 months of your Medicare Part B coverage?  No    Doing well overall.  No recent falls.  Feels well.      Hypothyroidism: stable, feels well on medication     Sjogrens: stable     Bronchiectasis: pulm notes reviewed 2015.  Her breathing is at baseline but does get SOB with exertion at times.  Declines further workup or follow up     CKD3: asymptomatic.    Physical Health:    In general, how would you rate your overall physical health? poor    Outside of work, how many days during the week do you exercise? none    Outside of work, approximately how many minutes a day do you exercise?not applicable    If you drink alcohol do you typically have >3 drinks per day or >7 drinks per week? No    Do you usually eat at least 4 servings of fruit and vegetables a day, include whole grains & fiber and avoid regularly eating high fat or \"junk\" foods? NO    Do you have any problems taking medications regularly?  No    Do you have any side effects from medications? none    Needs assistance for the following daily activities: YES    Which of the following safety concerns are present in your home?  none identified     Hearing impairment: Yes, wearing hearing aids     In the past 6 months, have you been bothered by leaking of urine? yes    Hearing Screening:  Not completed today due to known hearing loss.    Mental Health:    In general, how would you rate your overall mental or emotional health? good  PHQ-2 Score:      Do you feel safe in your environment? Yes    Have you ever done Advance Care Planning? (For example, a Health Directive, POLST, or a discussion with a medical provider or your loved ones about your wishes): No, advance care planning information given to patient to review.  Patient plans to discuss their wishes with " loved ones or provider.      Additional concerns to address?  No    Fall risk:  Fallen 2 or more times in the past year?: No  Any fall with injury in the past year?: No    Cognitive Screenin) Repeat 3 items (Leader, Season, Table)    2) Clock draw: NORMAL  3) 3 item recall: Recalls 3 objects  Results: 3 items recalled: COGNITIVE IMPAIRMENT LESS LIKELY    Mini-CogTM Copyright MYRIAM Ward. Licensed by the author for use in [facility]; reprinted with permission (ruy@[facility]). All rights reserved.      Do you have sleep apnea, excessive snoring or daytime drowsiness?: no        -------------------------------------    Reviewed and updated as needed this visit by clinical staff   Tobacco  Allergies  Meds              Reviewed and updated as needed this visit by Provider                 Social History     Tobacco Use     Smoking status: Former     Types: Cigarettes     Quit date: 10/4/1965     Years since quittin.5     Smokeless tobacco: Never   Vaping Use     Vaping status: Not on file   Substance Use Topics     Alcohol use: Yes     Alcohol/week: 0.0 standard drinks of alcohol     Comment: special occasions              View : No data to display.                   View : No data to display.              Do you have a current opioid prescription? No  Do you use any other controlled substances or medications that are not prescribed by a provider? None                      Current providers sharing in care for this patient include:   Patient Care Team:  Loc Ferrer MD as PCP - General (Family Medicine)  Arleth Kern [facility] as Pharmacist (Pharmacist)  Loc Ferrer MD as Assigned PCP    The following health maintenance items are reviewed in Epic and correct as of today:  Health Maintenance   Topic Date Due     MICROALBUMIN  Never done     ZOSTER IMMUNIZATION (2 of 3) 2013     LIPID  10/27/2015     COVID-19 Vaccine (4 - Booster for Moderna series) 2022     INFLUENZA  VACCINE (1) 09/01/2022     TSH W/FREE T4 REFLEX  10/29/2022     FALL RISK ASSESSMENT  10/29/2022     DTAP/TDAP/TD IMMUNIZATION (2 - Td or Tdap) 10/31/2022     PHQ-2 (once per calendar year)  01/01/2023     HEMOGLOBIN  06/22/2023     BMP  06/22/2023     MEDICARE ANNUAL WELLNESS VISIT  04/04/2024     ADVANCE CARE PLANNING  04/04/2028     Pneumococcal Vaccine: 65+ Years  Completed     URINALYSIS  Completed     IPV IMMUNIZATION  Aged Out     MENINGITIS IMMUNIZATION  Aged Out     Lab work is in process      ROS:  Constitutional, HEENT, cardiovascular, pulmonary, GI, , musculoskeletal, neuro, skin, endocrine and psych systems are negative, except as otherwise noted.    OBJECTIVE:   Pulse 106   Ht 1.524 m (5')   Wt 71.8 kg (158 lb 3.2 oz)   SpO2 95%   BMI 30.90 kg/m   Estimated body mass index is 30.9 kg/m  as calculated from the following:    Height as of this encounter: 1.524 m (5').    Weight as of this encounter: 71.8 kg (158 lb 3.2 oz).  EXAM:   GENERAL APPEARANCE: healthy, alert and no distress  EYES: Eyes grossly normal to inspection, PERRL and conjunctivae and sclerae normal  HENT: ear canals and TM's normal, nose and mouth without ulcers or lesions, oropharynx clear and oral mucous membranes moist  NECK: no adenopathy, no asymmetry, masses, or scars and thyroid normal to palpation  RESP: lungs clear to auscultation - no rales, rhonchi or wheezes  BREAST: normal without masses, tenderness or nipple discharge and no palpable axillary masses or adenopathy  CV: regular rates and rhythm, normal S1 S2, no S3 or S4, no peripheral edema, peripheral pulses strong and grade 2/6 BOLIVAR murmur heard best over the RUSB  ABDOMEN: soft, nontender, no hepatosplenomegaly, no masses and bowel sounds normal  MS: no musculoskeletal defects are noted and gait is age appropriate without ataxia  SKIN: no suspicious lesions or rashes  NEURO: Normal strength and tone, sensory exam grossly normal, mentation intact and speech  normal  PSYCH: mentation appears normal and affect normal/bright    Diagnostic Test Results:  Labs reviewed in Epic  No results found for this or any previous visit (from the past 24 hour(s)).    ASSESSMENT / PLAN:   Encounter for Medicare annual wellness exam  - discussed preventative guidelines, healthy diet, exercise and weight management  Discussed heart murmur, she declines further work up  Sjogren's syndrome, with unspecified organ involvement (H)  asymptomatic    Bronchiectasis without complication (H)  Minimal, if any symptoms    Stage 3a chronic kidney disease (H)  Stable, cont to monitor.  Blood pressure control, blood sugar control, vitamin D supplementation  - Basic Metabolic Panel (8) ([facility])    Encounter for screening for hematologic disorder  - CBC with Diff/Plt ([facility])    Hypothyroidism, unspecified type  recheck  - TSH ([facility])      Patient has been advised of split billing requirements and indicates understanding: Yes    COUNSELING:  Reviewed preventive health counseling, as reflected in patient instructions       Regular exercise       Healthy diet/nutrition    Estimated body mass index is 30.9 kg/m  as calculated from the following:    Height as of this encounter: 1.524 m (5').    Weight as of this encounter: 71.8 kg (158 lb 3.2 oz).    Weight management plan: Discussed healthy diet and exercise guidelines    She reports that she quit smoking about 57 years ago. Her smoking use included cigarettes. She has never used smokeless tobacco.      I have reviewed Opioid Use Disorder and Substance Use Disorder risk factors and made any needed referrals.     Appropriate preventive services were discussed with this patient, including applicable screening as appropriate for cardiovascular disease, diabetes, osteopenia/osteoporosis, and glaucoma.  As appropriate for age/gender, discussed screening for colorectal cancer, prostate cancer, breast cancer, and cervical cancer. Checklist reviewing preventive  services available has been given to the patient.    Reviewed patients plan of care and provided an AVS. The Basic Care Plan (routine screening as documented in Health Maintenance) for June meets the Care Plan requirement. This Care Plan has been established and reviewed with the Patient.    Counseling Resources:  ATP IV Guidelines  Pooled Cohorts Equation Calculator  Breast Cancer Risk Calculator  BRCA-Related Cancer Risk Assessment: FHS-7 Tool  FRAX Risk Assessment  ICSI Preventive Guidelines  Dietary Guidelines for Americans, 2010  USDA's MyPlate  ASA Prophylaxis  Lung CA Screening    Loc Ferrer MD  [facility]

## 2023-04-04 NOTE — PATIENT INSTRUCTIONS
Patient Education   Personalized Prevention Plan  You are due for the preventive services outlined below.  Your care team is available to assist you in scheduling these services.  If you have already completed any of these items, please share that information with your care team to update in your medical record.  Health Maintenance Due   Topic Date Due    Kidney Microalbumin Urine Test  Never done    Zoster (Shingles) Vaccine (2 of 3) 12/24/2013    Cholesterol Lab  10/27/2015    COVID-19 Vaccine (4 - Booster for Moderna series) 03/11/2022    Flu Vaccine (1) 09/01/2022    Thyroid Function Lab  10/29/2022    FALL RISK ASSESSMENT  10/29/2022    Diptheria Tetanus Pertussis (DTAP/TDAP/TD) Vaccine (2 - Td or Tdap) 10/31/2022    PHQ-2 (once per calendar year)  01/01/2023    Hemoglobin  06/22/2023

## 2023-04-05 LAB
BUN SERPL-MCNC: 20 MG/DL (ref 10–36)
BUN/CREATININE RATIO: 14 (ref 12–28)
CALCIUM SERPL-MCNC: 9.6 MG/DL (ref 8.7–10.3)
CHLORIDE SERPLBLD-SCNC: 104 MMOL/L (ref 96–106)
CREAT SERPL-MCNC: 1.39 MG/DL (ref 0.57–1)
EGFR: 35 ML/MIN/1.73
GLUCOSE SERPL-MCNC: 126 MG/DL (ref 70–99)
POTASSIUM SERPL-SCNC: 4.2 MMOL/L (ref 3.5–5.2)
SODIUM SERPL-SCNC: 142 MMOL/L (ref 134–144)
TOTAL CO2: 23 MMOL/L (ref 20–29)
TSH BLD-ACNC: 9.04 UIU/ML (ref 0.45–4.5)

## 2023-04-28 DIAGNOSIS — E03.9 HYPOTHYROIDISM, UNSPECIFIED TYPE: ICD-10-CM

## 2023-04-28 RX ORDER — LEVOTHYROXINE SODIUM 75 UG/1
TABLET ORAL
Qty: 90 TABLET | Refills: 0 | Status: SHIPPED | OUTPATIENT
Start: 2023-04-28

## 2023-04-28 NOTE — TELEPHONE ENCOUNTER
levothyroxine      LOV 4/4/23  BP Readings from Last 3 Encounters:   06/22/22 (!) 142/83   10/29/21 124/80   10/28/20 110/72     Component      Latest Ref Rng 4/4/2023  2:28 PM   TSH      0.450 - 4.500 uIU/mL 9.040 (H)       Legend:  (H) High    4/6/23- recheck TSH in 2 months   Everything stable but TSH mildly elevated.  Recommend recheck in 2 months and increase dose if TSH still high.     Kidney function slightly more reduced but not worrisome.     Loc Ferrer MD

## 2023-05-05 NOTE — TELEPHONE ENCOUNTER
5/5/23--Priya left message for the patient on 5/2/23    Davis Jane,   [facility]  [phone]

## 2024-08-21 ENCOUNTER — PATIENT OUTREACH (OUTPATIENT)
Dept: CARE COORDINATION | Facility: CLINIC | Age: 89
End: 2024-08-21
Payer: MEDICARE

## 2024-08-21 NOTE — PROGRESS NOTES
Clinic Care Coordination Contact  [facility]/Voicemail    Clinical Data: Care Coordinator Outreach to offer SW CC services as pt is ACO reach rising risk and overdue for Medicare AWV. Pt has not been seen by PCP: Dr. Ferrer and [facility] since 4/4/23 so may have switched primary care providers.    Outreach Documentation Number of Outreach Attempt   8/21/2024   2:36 PM 1       Left message on patient's voicemail with call back information and requested return call.    Plan: Care Coordinator will send unable to contact letter with care coordinator contact information via [employer]. Care Coordinator will try to reach patient again in 10 business days.    Valerie Pettit [facility]  Social Work Care Coordinator  Phone: [phone]

## 2024-09-05 NOTE — PROGRESS NOTES
Clinic Care Coordination Contact    Situation: Patient chart reviewed by care coordinator.    Background: Chart review reveals pt was enrolled in hospice in 7/2024.     Assessment: Will close.    Plan/Recommendations: No further outreach planned at this time.    Valerie Pettit [facility]  Social Work Care Coordinator  Phone: [phone]